# Patient Record
Sex: MALE | Race: WHITE | NOT HISPANIC OR LATINO | Employment: OTHER | URBAN - METROPOLITAN AREA
[De-identification: names, ages, dates, MRNs, and addresses within clinical notes are randomized per-mention and may not be internally consistent; named-entity substitution may affect disease eponyms.]

---

## 2018-03-07 ENCOUNTER — OFFICE VISIT (OUTPATIENT)
Dept: SURGERY | Facility: CLINIC | Age: 83
End: 2018-03-07
Payer: MEDICARE

## 2018-03-07 VITALS
HEIGHT: 69 IN | SYSTOLIC BLOOD PRESSURE: 126 MMHG | HEART RATE: 90 BPM | BODY MASS INDEX: 30.53 KG/M2 | DIASTOLIC BLOOD PRESSURE: 66 MMHG | WEIGHT: 206.13 LBS

## 2018-03-07 DIAGNOSIS — R22.2 MASS ON BACK: Primary | ICD-10-CM

## 2018-03-07 PROCEDURE — 99999 PR PBB SHADOW E&M-NEW PATIENT-LVL III: CPT | Mod: PBBFAC,,, | Performed by: SURGERY

## 2018-03-07 PROCEDURE — 99203 OFFICE O/P NEW LOW 30 MIN: CPT | Mod: S$GLB,,, | Performed by: SURGERY

## 2018-03-07 RX ORDER — CHOLECALCIFEROL (VITAMIN D3) 50 MCG
2000 TABLET ORAL DAILY
COMMUNITY

## 2018-03-07 RX ORDER — TAMSULOSIN HYDROCHLORIDE 0.4 MG/1
CAPSULE ORAL
COMMUNITY
Start: 2018-02-14

## 2018-03-07 RX ORDER — ALLOPURINOL 100 MG/1
100 TABLET ORAL 2 TIMES DAILY
COMMUNITY
Start: 2017-09-06

## 2018-03-07 RX ORDER — DONEPEZIL HYDROCHLORIDE 10 MG/1
10 TABLET, FILM COATED ORAL NIGHTLY
COMMUNITY
Start: 2018-02-22 | End: 2019-02-22

## 2018-03-07 RX ORDER — LOSARTAN POTASSIUM AND HYDROCHLOROTHIAZIDE 12.5; 5 MG/1; MG/1
TABLET ORAL
Status: ON HOLD | COMMUNITY
Start: 2018-02-14 | End: 2018-04-12 | Stop reason: HOSPADM

## 2018-03-07 RX ORDER — SIMVASTATIN 10 MG/1
10 TABLET, FILM COATED ORAL NIGHTLY
COMMUNITY
Start: 2018-02-15 | End: 2019-02-15

## 2018-03-07 RX ORDER — GEMFIBROZIL 600 MG/1
600 TABLET, FILM COATED ORAL NIGHTLY
COMMUNITY

## 2018-03-07 RX ORDER — ESCITALOPRAM OXALATE 10 MG/1
TABLET ORAL
COMMUNITY
Start: 2018-02-14

## 2018-03-07 RX ORDER — SODIUM CHLORIDE 9 MG/ML
INJECTION, SOLUTION INTRAVENOUS CONTINUOUS
Status: CANCELLED | OUTPATIENT
Start: 2018-03-07

## 2018-03-07 NOTE — LETTER
March 17, 2018      Justino Sy MD  [address]  [place] [address]           [place] [facility] - General Surgery  [address]  [place] [address]  Phone: [phone]          Patient: Blake Bradley   MR Number: [id number]   YOB: 1928   Date of Visit: 3/7/2018       Dear Dr. Justino Sy:    Thank you for referring Blake Bradley to me for evaluation. Attached you will find relevant portions of my assessment and plan of care.    If you have questions, please do not hesitate to call me. I look forward to following Blake Bradley along with you.    Sincerely,    Roberto Matthews MD    Enclosure  CC:  No Recipients    If you would like to receive this communication electronically, please contact [email or web address] or [phone] to request more information on [employer] access.    For providers and/or their staff who would like to refer a patient to [facility], please contact us through our one-stop-shop provider referral line, [facility], at [phone].    If you feel you have received this communication in error or would no longer like to receive these types of communications, please e-mail [email or web address]

## 2018-03-13 ENCOUNTER — HOSPITAL ENCOUNTER (OUTPATIENT)
Dept: PREADMISSION TESTING | Facility: HOSPITAL | Age: 83
Discharge: HOME | End: 2018-03-13
Attending: SURGERY
Payer: MEDICARE

## 2018-03-13 VITALS — WEIGHT: 200 LBS | HEIGHT: 70 IN | BODY MASS INDEX: 28.63 KG/M2

## 2018-03-13 DIAGNOSIS — R22.2 MASS ON BACK: Primary | ICD-10-CM

## 2018-03-13 DIAGNOSIS — Z01.818 PRE-OP EXAM: ICD-10-CM

## 2018-03-13 LAB
ALBUMIN SERPL BCP-MCNC: 3.5 G/DL
ALP SERPL-CCNC: 71 U/L
ALT SERPL W/O P-5'-P-CCNC: 12 U/L
ANION GAP SERPL CALC-SCNC: 10 MMOL/L
AST SERPL-CCNC: 15 U/L
BASOPHILS # BLD AUTO: 0 K/UL
BASOPHILS NFR BLD: 0.6 %
BILIRUB SERPL-MCNC: 0.6 MG/DL
BUN SERPL-MCNC: 20 MG/DL
CALCIUM SERPL-MCNC: 9.1 MG/DL
CHLORIDE SERPL-SCNC: 106 MMOL/L
CO2 SERPL-SCNC: 28 MMOL/L
CREAT SERPL-MCNC: 1.2 MG/DL
DIFFERENTIAL METHOD: ABNORMAL
EOSINOPHIL # BLD AUTO: 0.3 K/UL
EOSINOPHIL NFR BLD: 4.3 %
ERYTHROCYTE [DISTWIDTH] IN BLOOD BY AUTOMATED COUNT: 15.1 %
EST. GFR  (AFRICAN AMERICAN): >60 ML/MIN/1.73 M^2
EST. GFR  (NON AFRICAN AMERICAN): 53 ML/MIN/1.73 M^2
GLUCOSE SERPL-MCNC: 95 MG/DL
HCT VFR BLD AUTO: 40.9 %
HGB BLD-MCNC: 13.5 G/DL
LYMPHOCYTES # BLD AUTO: 0.7 K/UL
LYMPHOCYTES NFR BLD: 11.9 %
MCH RBC QN AUTO: 32.1 PG
MCHC RBC AUTO-ENTMCNC: 33 G/DL
MCV RBC AUTO: 97 FL
MONOCYTES # BLD AUTO: 0.4 K/UL
MONOCYTES NFR BLD: 7.1 %
NEUTROPHILS # BLD AUTO: 4.4 K/UL
NEUTROPHILS NFR BLD: 76.1 %
PLATELET # BLD AUTO: 132 K/UL
PMV BLD AUTO: 9.1 FL
POTASSIUM SERPL-SCNC: 4.3 MMOL/L
PROT SERPL-MCNC: 6.5 G/DL
RBC # BLD AUTO: 4.21 M/UL
SODIUM SERPL-SCNC: 144 MMOL/L
WBC # BLD AUTO: 5.8 K/UL

## 2018-03-13 PROCEDURE — 85025 COMPLETE CBC W/AUTO DIFF WBC: CPT

## 2018-03-13 PROCEDURE — 93010 ELECTROCARDIOGRAM REPORT: CPT | Mod: ,,, | Performed by: INTERNAL MEDICINE

## 2018-03-13 PROCEDURE — 36415 COLL VENOUS BLD VENIPUNCTURE: CPT

## 2018-03-13 PROCEDURE — 80053 COMPREHEN METABOLIC PANEL: CPT

## 2018-03-13 PROCEDURE — 93005 ELECTROCARDIOGRAM TRACING: CPT

## 2018-03-13 PROCEDURE — 99900103 DSU ONLY-NO CHARGE-INITIAL HR (STAT)

## 2018-03-13 PROCEDURE — 99900104 DSU ONLY-NO CHARGE-EA ADD'L HR (STAT)

## 2018-03-17 NOTE — PROGRESS NOTES
Subjective:       Patient ID: Blake Bradley is a 89 y.o. male.    Chief Complaint: Consult (growth on back, needs biopsy)      HPI 89-year-old male referred for evaluation of a right lower back mass. CT showed suspicious features and excision was recommended.    Past Medical History:   Diagnosis Date    Depression     Disorder of kidney and ureter     renal stones and cysts    Gout     Hyperlipidemia     Hypertension     Kidney stones     Macular degeneration     Psoriasis     PVD (peripheral vascular disease)      Past Surgical History:   Procedure Laterality Date    Laft knee      50 years ago; repair         Current Outpatient Prescriptions:     allopurinol (ZYLOPRIM) 100 MG tablet, , Disp: , Rfl:     donepezil (ARICEPT) 10 MG tablet, Take 10 mg by mouth., Disp: , Rfl:     escitalopram oxalate (LEXAPRO) 10 MG tablet, TAKE ONE TABLET BY MOUTH ONCE DAILY, Disp: , Rfl:     gemfibrozil (LOPID) 600 MG tablet, Take 600 mg by mouth., Disp: , Rfl:     losartan-hydrochlorothiazide 50-12.5 mg (HYZAAR) 50-12.5 mg per tablet, TAKE ONE TABLET BY MOUTH ONCE DAILY, Disp: , Rfl:     simvastatin (ZOCOR) 10 MG tablet, Take 10 mg by mouth., Disp: , Rfl:     tamsulosin (FLOMAX) 0.4 mg Cp24, TAKE ONE CAPSULE BY MOUTH ONCE DAILY, Disp: , Rfl:     vitamin D 1000 units Tab, Take by mouth., Disp: , Rfl:     Review of patient's allergies indicates:   Allergen Reactions    Augmentin [amoxicillin-pot clavulanate]     Clindamycin     Iodine and iodide containing products     Pcn [penicillins]        History reviewed. No pertinent family history.  Social History     Social History    Marital status:      Spouse name: N/A    Number of children: N/A    Years of education: N/A     Occupational History    Not on file.     Social History Main Topics    Smoking status: Never Smoker    Smokeless tobacco: Never Used    Alcohol use No    Drug use: No    Sexual activity: Not on file     Other Topics Concern     Not on file     Social History Narrative    No narrative on file       Review of Systems   Constitutional: Negative for chills, fatigue, fever and unexpected weight change.   HENT: Negative for congestion, sore throat, trouble swallowing and voice change.    Eyes: Negative for redness and visual disturbance.   Respiratory: Negative for cough, shortness of breath and wheezing.    Cardiovascular: Negative for chest pain and palpitations.   Gastrointestinal: Negative for abdominal pain, blood in stool, nausea and vomiting.   Genitourinary: Negative for dysuria, frequency, hematuria and urgency.   Musculoskeletal: Negative for arthralgias, myalgias and neck pain.   Skin: Negative for rash and wound.   Allergic/Immunologic: Negative.    Neurological: Negative for tremors, seizures, weakness and headaches.   Hematological: Does not bruise/bleed easily.   Psychiatric/Behavioral: Negative for confusion and dysphoric mood. The patient is not nervous/anxious.      Objective:     Physical Exam   Constitutional: He is oriented to person, place, and time. He appears well-developed and well-nourished. No distress.   HENT:   Head: Normocephalic and atraumatic.   Eyes: Conjunctivae and EOM are normal. Pupils are equal, round, and reactive to light. No scleral icterus.   Neck: Normal range of motion. Neck supple. No thyromegaly present.   Cardiovascular: Normal rate, regular rhythm, normal heart sounds and intact distal pulses.    No murmur heard.  Pulmonary/Chest: Effort normal and breath sounds normal. No respiratory distress. He has no wheezes. He has no rales.   Abdominal: Soft. Bowel sounds are normal. He exhibits no distension. There is no tenderness. No hernia.   Musculoskeletal: Normal range of motion. He exhibits no edema or tenderness.   Lymphadenopathy:     He has no cervical adenopathy.   Neurological: He is alert and oriented to person, place, and time. No cranial nerve deficit.   Skin: Skin is warm and dry. No rash  noted. No erythema.   10 CM right lower back mass. Non tender. Has the consistency of a lipoma.   Psychiatric: He has a normal mood and affect. His behavior is normal. Judgment normal.     Assessment:     Encounter Diagnosis   Name Primary?    Mass on back Yes       Plan:      1. Plan excision of back mass.  2. Risks and benefits of the planned procedure were discussed at length with the patient.  Risks and benefits of not proceeding with the procedure were discussed as well. All questions were answered. The patient expressed clear understanding and would like to proceed with the procedure as discussed.

## 2018-03-21 ENCOUNTER — ANESTHESIA EVENT (OUTPATIENT)
Dept: SURGERY | Facility: HOSPITAL | Age: 83
End: 2018-03-21
Payer: MEDICARE

## 2018-03-22 ENCOUNTER — HOSPITAL ENCOUNTER (OUTPATIENT)
Facility: HOSPITAL | Age: 83
Discharge: HOME | End: 2018-03-22
Attending: SURGERY | Admitting: SURGERY
Payer: MEDICARE

## 2018-03-22 ENCOUNTER — TELEPHONE (OUTPATIENT)
Dept: SURGERY | Facility: CLINIC | Age: 83
End: 2018-03-22

## 2018-03-22 ENCOUNTER — ANESTHESIA (OUTPATIENT)
Dept: SURGERY | Facility: HOSPITAL | Age: 83
End: 2018-03-22
Payer: MEDICARE

## 2018-03-22 DIAGNOSIS — R22.2 MASS ON BACK: Primary | ICD-10-CM

## 2018-03-22 PROCEDURE — 71000033 HC RECOVERY, INTIAL HOUR: Performed by: SURGERY

## 2018-03-22 PROCEDURE — 63600175 PHARM REV CODE 636 W HCPCS: Performed by: SURGERY

## 2018-03-22 PROCEDURE — 36000706: Performed by: SURGERY

## 2018-03-22 PROCEDURE — 25000003 PHARM REV CODE 250: Performed by: SURGERY

## 2018-03-22 PROCEDURE — 21933 EXC BACK TUM DEEP 5 CM/>: CPT | Mod: ,,, | Performed by: SURGERY

## 2018-03-22 PROCEDURE — C1729 CATH, DRAINAGE: HCPCS | Performed by: SURGERY

## 2018-03-22 PROCEDURE — 36000707: Performed by: SURGERY

## 2018-03-22 PROCEDURE — 99900103 DSU ONLY-NO CHARGE-INITIAL HR (STAT): Performed by: SURGERY

## 2018-03-22 PROCEDURE — 37000009 HC ANESTHESIA EA ADD 15 MINS: Performed by: SURGERY

## 2018-03-22 PROCEDURE — 27201423 OPTIME MED/SURG SUP & DEVICES STERILE SUPPLY: Performed by: SURGERY

## 2018-03-22 PROCEDURE — 37000008 HC ANESTHESIA 1ST 15 MINUTES: Performed by: SURGERY

## 2018-03-22 PROCEDURE — 27200651 HC AIRWAY, LMA: Performed by: NURSE ANESTHETIST, CERTIFIED REGISTERED

## 2018-03-22 PROCEDURE — 71000016 HC POSTOP RECOV ADDL HR: Performed by: SURGERY

## 2018-03-22 PROCEDURE — 88307 TISSUE EXAM BY PATHOLOGIST: CPT | Mod: 26,,, | Performed by: PATHOLOGY

## 2018-03-22 PROCEDURE — 25000003 PHARM REV CODE 250

## 2018-03-22 PROCEDURE — 88307 TISSUE EXAM BY PATHOLOGIST: CPT | Performed by: PATHOLOGY

## 2018-03-22 PROCEDURE — D9220A PRA ANESTHESIA: Mod: ANES,,, | Performed by: ANESTHESIOLOGY

## 2018-03-22 PROCEDURE — 71000015 HC POSTOP RECOV 1ST HR: Performed by: SURGERY

## 2018-03-22 PROCEDURE — 99900104 DSU ONLY-NO CHARGE-EA ADD'L HR (STAT): Performed by: SURGERY

## 2018-03-22 PROCEDURE — 63600175 PHARM REV CODE 636 W HCPCS: Performed by: NURSE ANESTHETIST, CERTIFIED REGISTERED

## 2018-03-22 PROCEDURE — D9220A PRA ANESTHESIA: Mod: CRNA,,, | Performed by: NURSE ANESTHETIST, CERTIFIED REGISTERED

## 2018-03-22 PROCEDURE — 25000003 PHARM REV CODE 250: Performed by: ANESTHESIOLOGY

## 2018-03-22 RX ORDER — FENTANYL CITRATE 50 UG/ML
INJECTION, SOLUTION INTRAMUSCULAR; INTRAVENOUS
Status: DISCONTINUED | OUTPATIENT
Start: 2018-03-22 | End: 2018-03-22

## 2018-03-22 RX ORDER — HYDROCODONE BITARTRATE AND ACETAMINOPHEN 7.5; 325 MG/1; MG/1
1 TABLET ORAL EVERY 4 HOURS PRN
Qty: 30 TABLET | Refills: 0 | Status: SHIPPED | OUTPATIENT
Start: 2018-03-22 | End: 2018-04-01

## 2018-03-22 RX ORDER — PROPOFOL 10 MG/ML
VIAL (ML) INTRAVENOUS
Status: DISCONTINUED | OUTPATIENT
Start: 2018-03-22 | End: 2018-03-22

## 2018-03-22 RX ORDER — HYDROMORPHONE HYDROCHLORIDE 2 MG/ML
0.2 INJECTION, SOLUTION INTRAMUSCULAR; INTRAVENOUS; SUBCUTANEOUS EVERY 5 MIN PRN
Status: DISCONTINUED | OUTPATIENT
Start: 2018-03-22 | End: 2018-03-22 | Stop reason: HOSPADM

## 2018-03-22 RX ORDER — OXYCODONE HYDROCHLORIDE 5 MG/1
5 TABLET ORAL ONCE AS NEEDED
Status: DISCONTINUED | OUTPATIENT
Start: 2018-03-23 | End: 2018-03-22 | Stop reason: HOSPADM

## 2018-03-22 RX ORDER — LIDOCAINE HYDROCHLORIDE 10 MG/ML
INJECTION, SOLUTION EPIDURAL; INFILTRATION; INTRACAUDAL; PERINEURAL
Status: COMPLETED
Start: 2018-03-22 | End: 2018-03-22

## 2018-03-22 RX ORDER — PHENYLEPHRINE HYDROCHLORIDE 10 MG/ML
INJECTION INTRAVENOUS
Status: DISCONTINUED | OUTPATIENT
Start: 2018-03-22 | End: 2018-03-22

## 2018-03-22 RX ORDER — BUPIVACAINE HYDROCHLORIDE AND EPINEPHRINE 2.5; 5 MG/ML; UG/ML
INJECTION, SOLUTION EPIDURAL; INFILTRATION; INTRACAUDAL; PERINEURAL
Status: DISCONTINUED | OUTPATIENT
Start: 2018-03-22 | End: 2018-03-22 | Stop reason: HOSPADM

## 2018-03-22 RX ORDER — ONDANSETRON 2 MG/ML
4 INJECTION INTRAMUSCULAR; INTRAVENOUS ONCE
Status: DISCONTINUED | OUTPATIENT
Start: 2018-03-22 | End: 2018-03-22 | Stop reason: HOSPADM

## 2018-03-22 RX ORDER — DIPHENHYDRAMINE HYDROCHLORIDE 50 MG/ML
25 INJECTION INTRAMUSCULAR; INTRAVENOUS EVERY 6 HOURS PRN
Status: DISCONTINUED | OUTPATIENT
Start: 2018-03-22 | End: 2018-03-22 | Stop reason: HOSPADM

## 2018-03-22 RX ORDER — HYDROCODONE BITARTRATE AND ACETAMINOPHEN 5; 325 MG/1; MG/1
1 TABLET ORAL EVERY 4 HOURS PRN
Status: DISCONTINUED | OUTPATIENT
Start: 2018-03-22 | End: 2018-03-22 | Stop reason: HOSPADM

## 2018-03-22 RX ORDER — MEPERIDINE HYDROCHLORIDE 25 MG/ML
12.5 INJECTION INTRAMUSCULAR; INTRAVENOUS; SUBCUTANEOUS ONCE AS NEEDED
Status: DISCONTINUED | OUTPATIENT
Start: 2018-03-22 | End: 2018-03-22 | Stop reason: HOSPADM

## 2018-03-22 RX ORDER — SODIUM CHLORIDE 9 MG/ML
INJECTION, SOLUTION INTRAVENOUS CONTINUOUS
Status: DISCONTINUED | OUTPATIENT
Start: 2018-03-22 | End: 2018-03-22 | Stop reason: HOSPADM

## 2018-03-22 RX ORDER — LIDOCAINE HYDROCHLORIDE 10 MG/ML
5 INJECTION, SOLUTION EPIDURAL; INFILTRATION; INTRACAUDAL; PERINEURAL ONCE
Status: COMPLETED | OUTPATIENT
Start: 2018-03-22 | End: 2018-03-22

## 2018-03-22 RX ORDER — SODIUM CHLORIDE 0.9 % (FLUSH) 0.9 %
3 SYRINGE (ML) INJECTION
Status: DISCONTINUED | OUTPATIENT
Start: 2018-03-22 | End: 2018-03-22 | Stop reason: HOSPADM

## 2018-03-22 RX ORDER — FENTANYL CITRATE 50 UG/ML
25 INJECTION, SOLUTION INTRAMUSCULAR; INTRAVENOUS EVERY 5 MIN PRN
Status: DISCONTINUED | OUTPATIENT
Start: 2018-03-22 | End: 2018-03-22 | Stop reason: HOSPADM

## 2018-03-22 RX ORDER — ONDANSETRON 2 MG/ML
INJECTION INTRAMUSCULAR; INTRAVENOUS
Status: DISCONTINUED | OUTPATIENT
Start: 2018-03-22 | End: 2018-03-22

## 2018-03-22 RX ORDER — MIDAZOLAM HYDROCHLORIDE 1 MG/ML
INJECTION, SOLUTION INTRAMUSCULAR; INTRAVENOUS
Status: DISCONTINUED | OUTPATIENT
Start: 2018-03-22 | End: 2018-03-22

## 2018-03-22 RX ADMIN — MIDAZOLAM 1 MG: 1 INJECTION INTRAMUSCULAR; INTRAVENOUS at 09:03

## 2018-03-22 RX ADMIN — FENTANYL CITRATE 100 MCG: 50 INJECTION, SOLUTION INTRAMUSCULAR; INTRAVENOUS at 10:03

## 2018-03-22 RX ADMIN — LIDOCAINE HYDROCHLORIDE: 10 INJECTION, SOLUTION EPIDURAL; INFILTRATION; INTRACAUDAL; PERINEURAL at 08:03

## 2018-03-22 RX ADMIN — PROPOFOL 100 MG: 10 INJECTION, EMULSION INTRAVENOUS at 10:03

## 2018-03-22 RX ADMIN — PHENYLEPHRINE HYDROCHLORIDE 100 MCG: 10 INJECTION INTRAVENOUS at 11:03

## 2018-03-22 RX ADMIN — SODIUM CHLORIDE, SODIUM GLUCONATE, SODIUM ACETATE, POTASSIUM CHLORIDE, MAGNESIUM CHLORIDE, SODIUM PHOSPHATE, DIBASIC, AND POTASSIUM PHOSPHATE: .53; .5; .37; .037; .03; .012; .00082 INJECTION, SOLUTION INTRAVENOUS at 08:03

## 2018-03-22 RX ADMIN — MIDAZOLAM 1 MG: 1 INJECTION INTRAMUSCULAR; INTRAVENOUS at 10:03

## 2018-03-22 RX ADMIN — VANCOMYCIN HYDROCHLORIDE 1000 MG: 1 INJECTION, POWDER, LYOPHILIZED, FOR SOLUTION INTRAVENOUS at 09:03

## 2018-03-22 RX ADMIN — VANCOMYCIN HYDROCHLORIDE 1 G: 1 INJECTION, POWDER, LYOPHILIZED, FOR SOLUTION INTRAVENOUS at 09:03

## 2018-03-22 RX ADMIN — ONDANSETRON 4 MG: 2 INJECTION, SOLUTION INTRAMUSCULAR; INTRAVENOUS at 10:03

## 2018-03-22 RX ADMIN — PHENYLEPHRINE HYDROCHLORIDE 100 MCG: 10 INJECTION INTRAVENOUS at 10:03

## 2018-03-22 NOTE — TELEPHONE ENCOUNTER
----- Message from Neelam Swanson sent at 3/22/2018 12:09 PM CDT -----  Contact: [facility] needs to make a 1 week post op appt to see Dr  No appt available in 1 week     Patient wants afternoon in [place]       Please call back [phone] (home)

## 2018-03-22 NOTE — BRIEF OP NOTE
Patient: Blake Bradley     Date of Procedure: 3/22/2018    Procedure: Excision of right lower subcutaneous back mass    Surgeon: Roberto Muro MD    Assistant: Ivy Hills    Pre-op Diagnosis: Mass on back [R22.2]     Post-op Diagnosis: Mass on back [R22.2]    Findings: large adherent back mass    Specimen: back mass    EBL: 50 cc    Complications: None

## 2018-03-22 NOTE — ANESTHESIA PREPROCEDURE EVALUATION
03/22/2018  Blake Bradley is a 89 y.o., male.    Anesthesia Evaluation    I have reviewed the Patient Summary Reports.    I have reviewed the Nursing Notes.   I have reviewed the Medications.     Review of Systems  Social:  Non-Smoker    Cardiovascular:   Hypertension hyperlipidemia    Renal/:   Chronic Renal Disease    Psych:   Psychiatric History          Physical Exam  General:  Obesity    Airway/Jaw/Neck:  Airway Findings: Mouth Opening: Normal Tongue: Normal  General Airway Assessment: Adult, Good  Mallampati: II  Improves to II with phonation.  TM Distance: 4-6 cm      Dental:  Dental Findings: In tact   Chest/Lungs:  Chest/Lungs Findings: Clear to auscultation, Normal Respiratory Rate     Heart/Vascular:  Heart Findings: Rate: Normal  Rhythm: Regular Rhythm  Sounds: Normal  Heart murmur: negative       Mental Status:  Mental Status Findings:  Cooperative, Alert and Oriented         Anesthesia Plan  Type of Anesthesia, risks & benefits discussed:  Anesthesia Type:  general  Patient's Preference:   Intra-op Monitoring Plan: standard ASA monitors  Intra-op Monitoring Plan Comments:   Post Op Pain Control Plan:   Post Op Pain Control Plan Comments:   Induction:   IV  Beta Blocker:  Patient is not currently on a Beta-Blocker (No further documentation required).       Informed Consent: Patient understands risks and agrees with Anesthesia plan.  Questions answered. Anesthesia consent signed with patient.  ASA Score: 3     Day of Surgery Review of History & Physical: I have interviewed and examined the patient. I have reviewed the patient's H&P dated:  There are no significant changes.          Ready For Surgery From Anesthesia Perspective.

## 2018-03-22 NOTE — PLAN OF CARE
Pt lying in bed sleeping. No signs/symptoms of pain or distress. Family updated upon pt arrival to recovery room per MD.

## 2018-03-22 NOTE — ANESTHESIA POSTPROCEDURE EVALUATION
"Anesthesia Post Evaluation    Patient: Blake Bradley    Procedure(s) Performed: Procedure(s) (LRB):  REMOVAL-MASS (Right)    Final Anesthesia Type: general  Patient location during evaluation: PACU  Patient participation: Yes- Able to Participate  Level of consciousness: awake and alert and oriented  Post-procedure vital signs: reviewed and stable  Pain management: adequate  Airway patency: patent  PONV status at discharge: No PONV  Anesthetic complications: no      Cardiovascular status: blood pressure returned to baseline  Respiratory status: unassisted, spontaneous ventilation and room air  Hydration status: euvolemic  Follow-up not needed.        Visit Vitals  BP (!) 127/55   Pulse 67   Temp 37 °C (98.6 °F) (Temporal)   Resp 16   Ht 5' 10" (1.778 m)   Wt 90.7 kg (200 lb)   SpO2 (!) 93%   BMI 28.70 kg/m²       Pain/Ethan Score: Pain Assessment Performed: Yes (3/22/2018 11:56 AM)  Presence of Pain: denies (3/22/2018 11:56 AM)  Ethan Score: 10 (3/22/2018 11:45 AM)      "

## 2018-03-22 NOTE — TRANSFER OF CARE
"Anesthesia Transfer of Care Note    Patient: Blake Bradley    Procedure(s) Performed: Procedure(s) (LRB):  REMOVAL-MASS (Right)    Patient location: PACU    Anesthesia Type: general    Transport from OR: Transported from OR on 2-3 L/min O2 by NC with adequate spontaneous ventilation    Post pain: adequate analgesia    Post assessment: no apparent anesthetic complications    Post vital signs: stable    Level of consciousness: awake    Nausea/Vomiting: no nausea/vomiting    Complications: none    Transfer of care protocol was followed      Last vitals:   Visit Vitals  /69 (BP Location: Left arm, Patient Position: Lying)   Pulse 77   Temp 37.5 °C (99.5 °F) (Other (see comments))   Resp 20   Ht 5' 10" (1.778 m)   Wt 90.7 kg (200 lb)   SpO2 96%   BMI 28.70 kg/m²     "

## 2018-03-22 NOTE — OR NURSING
Ambulated to bathroom to void using wife's rollator.  Tolerated well. Patient dressing in restroom assisting per spouse.

## 2018-03-22 NOTE — DISCHARGE SUMMARY
Discharge Summary  General Surgery      Admit Date: 3/22/2018    Discharge Date :3/22/2018    Attending Physician: Roberto Matthews     Discharge Physician: Roberto Matthews    Discharged Condition: good    Discharge Diagnosis: Mass on back [R22.2]    Treatments/Procedures: Procedure(s) (LRB):  REMOVAL-MASS (Right)    Hospital Course: Uneventful; Discharged home from Recovery    Significant Diagnostic Studies: none    Disposition: Home or Self Care    Diet: Regular    Follow up: Office 10-14 days    Activity: No heavy lifting till seen in office.    Patient Instructions:   Current Discharge Medication List      START taking these medications    Details   hydrocodone-acetaminophen 7.5-325mg (NORCO) 7.5-325 mg per tablet Take 1 tablet by mouth every 4 (four) hours as needed for Pain.  Qty: 30 tablet, Refills: 0         CONTINUE these medications which have NOT CHANGED    Details   allopurinol (ZYLOPRIM) 100 MG tablet       donepezil (ARICEPT) 10 MG tablet Take 10 mg by mouth.      escitalopram oxalate (LEXAPRO) 10 MG tablet TAKE ONE TABLET BY MOUTH ONCE DAILY      gemfibrozil (LOPID) 600 MG tablet Take 600 mg by mouth.      losartan-hydrochlorothiazide 50-12.5 mg (HYZAAR) 50-12.5 mg per tablet TAKE ONE TABLET BY MOUTH ONCE DAILY      simvastatin (ZOCOR) 10 MG tablet Take 10 mg by mouth.      tamsulosin (FLOMAX) 0.4 mg Cp24 TAKE ONE CAPSULE BY MOUTH ONCE DAILY      vitamin D 1000 units Tab Take by mouth.               Discharge Procedure Orders  Diet general     Activity as tolerated     Call MD for:  temperature >100.4     Call MD for:  persistent nausea and vomiting     Call MD for:  severe uncontrolled pain     Remove dressing in 48 hours

## 2018-03-22 NOTE — PLAN OF CARE
Vs stable.  Denies pain.  Tolerated po fluids.  Discharge teaching completed.  Discharging shortly.

## 2018-03-22 NOTE — DISCHARGE INSTRUCTIONS
Discharge Instructions: Caring for Your Ronny-Lai Drainage Tube  Your doctor discharges you with a Ronny-Lai drainage tube. Doctors commonly leave this drain within the abdominal cavity after surgery. It helps drain and collect blood and body fluid after surgery. This can prevent swelling and reduces the risk for infection. The tube is held in place by a few stitches. It is covered with a bandage. Your doctor will remove the drain when he or she determines you no longer need it.  Home care  · Dont sleep on the same side as the tube.  · Secure the tube and bag inside your clothing with a safety pin. This helps keep the tube from being pulled out.  · Empty your drain at least twice a day. Empty it more often if the drain is full. Wash  and dry your hands before emptying the drain.  ¨ Lift the opening on the drain.  ¨ Drain the fluid into a measuring cup.  ¨ Record the amount of fluid each time you empty the drain. Include the date and time it was emptied. Share this information with your doctor on your next visit.  ¨ Squeeze the bulb with your hands until you hear air coming out of the bulb if your doctor has instructed you to do so (sometimes the bulb is used as a reservoir without suction). Check with your doctor about specific drain instructions.  ¨ Close the opening.  · Change the dressing around the tube every day.  ¨ Wash your hands.  ¨ Remove the old bandage.  ¨ Wash your hands again.  ¨ Wet a cotton swab and clean the skin around the incision and tube site. Use normal saline solution (salt and water). Or, you can use warm, soapy water.  ¨ Put a new bandage on the incision and tube site. Make the bandage large enough to cover the whole incision area.  ¨ Tape the bandage in place.  · Keep the bandage and tube site dry when you shower. Ask your healthcare provider about the best way to do this.  · Stripping the tube helps keep blood clots from blocking the tube. Ask your nurse how often you should  strip the tube. Stripping may not be needed, depending on where and why your doctor placed the tube. It may even be dangerous in some cases.   ¨ Hold the tubing where it leaves the skin, with one hand. This keeps it from pulling on the skin.  ¨ Pinch the tubing with the thumb and first finger of your other hand.  ¨ Slowly and firmly pull your thumb and first finger down the tubing. You may find it helpful to hold an alcohol swab between your fingers and the tube to lubricate the tubing.  ¨ If the pulling hurts or feels like its coming out of the skin, stop. Begin again more gently.  Follow-up care  Make a follow-up appointment as directed by our staff.     When to seek medical care  Call your healthcare provider right away if you have any of the following:  · New or increased pain around the tube  · Redness, swelling, or warmth around the incision or tube  · Drainage that is foul-smelling  · Vomiting  · Fever of 100.4°F (38°C)  · Fluid leaking around the tube  · Incision seems not to be healing  · Stitches become loose  · Tube falls out or breaks  · Drainage that changes from light pink to dark red  · Blood clots in the drainage bulb  · A sudden increase or decrease in the amount of drainage (over 30 mL)   Date Last Reviewed: 2/1/2017 © 2000-2017 [employer] [address] [place] [address], [place] [address]. All rights reserved. This information is not intended as a substitute for professional medical care. Always follow your healthcare professional's instructions.        General Information:    1.  Do not drink alcoholic beverages including beer for 24 hours or as long as you are on pain medication..  2.  Do not drive a motor vehicle, operate machinery or power tools, or signs legal papers for 24 hours or as long as you are on pain medication.   3.  You may experience light-headedness, dizziness, and sleepiness following surgery. Please do not stay alone. A responsible adult should be with you for this 24  hour period.  4.  Go home and rest.    5. Progress slowly to a normal diet unless instructed.  Otherwise, begin with liquids such as soft drinks, then soup and crackers working up to solid foods. Drink plenty of nonalcoholic fluids.  6.  Certain anesthetics and pain medications produce nausea and vomiting in certain       individuals. If nausea becomes a problem at home, call you doctor.    7. A nurse will be calling you sometime after surgery. Do not be alarmed. This is our way of finding out how you are doing.    8. Several times every hour while you are awake, take 2-3 deep breaths and cough. If you had stomach surgery hold a pillow or rolled towel firmly against your stomach before you cough. This will help with any pain the cough might cause.  9. Several times every hour while you are awake, pump and flex your feet 5-6 times and do foot circles. This will help prevent blood clots.    10.Call your doctor for severe pain, bleeding, fever, or signs or symptoms of infection (pain, swelling, redness, foul odor, drainage).    Discharge Instructions: After Your Surgery/Procedure  Youve just had surgery. During surgery you were given medicine called anesthesia to keep you relaxed and free of pain. After surgery you may have some pain or nausea. This is common. Here are some tips for feeling better and getting well after surgery.     Stay on schedule with your medication.   Going home  Your doctor or nurse will show you how to take care of yourself when you go home. He or she will also answer your questions. Have an adult family member or friend drive you home.      For your safety we recommend these precaution for the first 24 hours after your procedure:  · Do not drive or use heavy equipment.  · Do not make important decisions or sign legal papers.  · Do not drink alcohol.  · Have someone stay with you, if needed. He or she can watch for problems and help keep you safe.  · Your concentration, balance, coordination,  "and judgement may be impaired for many hours after anesthesia.  Use caution when ambulating or standing up.     · You may feel weak and "washed out" after anesthesia and surgery.      Subtle residual effects of general anesthesia or sedation with regional / local anesthesia can last more than 24 hours.  Rest for the remainder of the day or longer if your Doctor/Surgeon has advised you to do so.  Although you may feel normal within the first 24 hours, your reflexes and mental ability may be impaired without you realizing it.  You may feel dizzy, lightheaded or sleepy for 24 hours or longer.      Be sure to go to all follow-up visits with your doctor. And rest after your surgery for as long as your doctor tells you to.  Coping with pain  If you have pain after surgery, pain medicine will help you feel better. Take it as told, before pain becomes severe. Also, ask your doctor or pharmacist about other ways to control pain. This might be with heat, ice, or relaxation. And follow any other instructions your surgeon or nurse gives you.  Tips for taking pain medicine  To get the best relief possible, remember these points:  · Pain medicines can upset your stomach. Taking them with a little food may help.  · Most pain relievers taken by mouth need at least 20 to 30 minutes to start to work.  · Taking medicine on a schedule can help you remember to take it. Try to time your medicine so that you can take it before starting an activity. This might be before you get dressed, go for a walk, or sit down for dinner.  · Constipation is a common side effect of pain medicines. Call your doctor before taking any medicines such as laxatives or stool softeners to help ease constipation. Also ask if you should skip any foods. Drinking lots of fluids and eating foods such as fruits and vegetables that are high in fiber can also help. Remember, do not take laxatives unless your surgeon has prescribed them.  · Drinking alcohol and taking " pain medicine can cause dizziness and slow your breathing. It can even be deadly. Do not drink alcohol while taking pain medicine.  · Pain medicine can make you react more slowly to things. Do not drive or run machinery while taking pain medicine.  Your health care provider may tell you to take acetaminophen to help ease your pain. Ask him or her how much you are supposed to take each day. Acetaminophen or other pain relievers may interact with your prescription medicines or other over-the-counter (OTC) drugs. Some prescription medicines have acetaminophen and other ingredients. Using both prescription and OTC acetaminophen for pain can cause you to overdose. Read the labels on your OTC medicines with care. This will help you to clearly know the list of ingredients, how much to take, and any warnings. It may also help you not take too much acetaminophen. If you have questions or do not understand the information, ask your pharmacist or health care provider to explain it to you before you take the OTC medicine.  Managing nausea  Some people have an upset stomach after surgery. This is often because of anesthesia, pain, or pain medicine, or the stress of surgery. These tips will help you handle nausea and eat healthy foods as you get better. If you were on a special food plan before surgery, ask your doctor if you should follow it while you get better. These tips may help:  · Do not push yourself to eat. Your body will tell you when to eat and how much.  · Start off with clear liquids and soup. They are easier to digest.  · Next try semi-solid foods, such as mashed potatoes, applesauce, and gelatin, as you feel ready.  · Slowly move to solid foods. Dont eat fatty, rich, or spicy foods at first.  · Do not force yourself to have 3 large meals a day. Instead eat smaller amounts more often.  · Take pain medicines with a small amount of solid food, such as crackers or toast, to avoid nausea.     Call your surgeon  if  · You still have pain an hour after taking medicine. The medicine may not be strong enough.  · You feel too sleepy, dizzy, or groggy. The medicine may be too strong.  · You have side effects like nausea, vomiting, or skin changes, such as rash, itching, or hives.       If you have obstructive sleep apnea  You were given anesthesia medicine during surgery to keep you comfortable and free of pain. After surgery, you may have more apnea spells because of this medicine and other medicines you were given. The spells may last longer than usual.   At home:  · Keep using the continuous positive airway pressure (CPAP) device when you sleep. Unless your health care provider tells you not to, use it when you sleep, day or night. CPAP is a common device used to treat obstructive sleep apnea.  · Talk with your provider before taking any pain medicine, muscle relaxants, or sedatives. Your provider will tell you about the possible dangers of taking these medicines.  © 4886-4081 [employer]. [address] [place] [address]. All rights reserved. This information is not intended as a substitute for professional medical care. Always follow your healthcare professional's instructions.  Post op instructions for prevention of DVT  What is deep vein thrombosis?  Deep vein thrombosis (DVT) is the medical term for blood clots in the deep veins of the leg.  These blood clots can be dangerous.  A DVT can block a blood vessel and keep blood from getting where it needs to go.  Another problem is that the clot can travel to other parts of the body such as the lungs.  A clot that travels to the lungs is called a pulmonary embolus (PE) and can cause serious problems with breathing which can lead to death.  Am I at risk for DVT/PE?  If you are not very active, you are at risk of DVT.  Anyone confined to bed, sitting for long periods of time, recovering from surgery, etc. increases the risk of DVT.  Other risk factors  are cancer diagnosis, certain medications, estrogen replacement in any form,older age, obesity, pregnancy, smoking, history of clotting disorders, and dehydration.  How will I know if I have a DVT?   Swelling in the lower leg   Pain   Warmth, redness, hardness or bulging of the vein  If you have any of these symptoms, call your doctors office right away.  Some people will not have any symptoms until the clot moves to the lungs.  What are the symptoms of a PE?   Panting, shortness of breath, or trouble breathing   Sharp, knife-like chest pain when you breathe   Coughing or coughing up blood   Rapid heartbeat  If you have any of these symptoms or get worse quickly, call 911 for emergency treatment.  How can I prevent a DVT?   Avoid long periods of inactivity and dont cross your legs--get up and walk around every hour or so.   Stay active--walking after surgery is highly encouraged.  This means you should get out of the house and walk in the neighborhood.  Going up and down stairs will not impair healing (unless advised against such activity by your doctor).     Drink plenty of noncaffeinated, nonalcoholic fluids each day to prevent dehydration.   Wear special support stockings, if they have been advised by your doctor.   If you travel, stop at least once an hour and walk around.   Avoid smoking (assistance with stopping is available through your healthcare provider)  Always notify your doctor if you are not able to follow the post operative instructions that are given to you at the time of discharge.  It may be necessary to prescribe one of the medications available to prevent DVT.Using Opioids for Pain Management     Your doctor has given instructions for you to take an opioid.  This is a drug for bad pain.  It helps control pain without causing bleeding and kidney problems.  Common opioid names are morphine, hydromorphone, oxycodone, and methadone. These drugs are called narcotics.    There are  several safety concerns you need to know.     · It is against the law to give or sell this drug to another person.  You must keep this medicine safely locked.    · You may have side effects from taking this medication.  These include nausea, itching, sweating, sleepiness, a change in your ability to breathe, and depression.  · Do not take alcohol or sleeping pills opioids.    · Long-term opoid use may no longer giver you relief from pain.  It can cause you stomach pain, mental anxiety, and headaches.  Long-term opoid use can potentially lead to unlawful street drug abuse and reduce your ability to stay employed.    · Your body may become opioid tolerant if you need to take more to get relief.    · You must stop taking opioids if you begin having more pain as a result of the medicine.    · Opioid withdrawal occurs when you have to stop taking the drug.  It can cause you to have nausea, vomiting, diarrhea, stomach pain, anxiety, and dilated pupils in your eyes. This condition means you are opioid dependent.    · Addiction is a drug induced brain disease. It means there are changes in how your brain is working.  Children, teens, and young adults under 25 years old are more likely to get addicted to opioids.      · Addiction can happen with repeated opioid use.  It does not happen with short-term use of two weeks or less.       For more information, please speak with your doctor or pharmacist.  Follow all instructions in the continuous select a flow nerve block catheter home care instruction pamphlet provided.     Dr. Matthews's nurse will call to schedule your 1 week follow up appointment.  If you don't received this call by lunch time tomorrow, call  to follow up.      We hope your stay was comfortable as you heal now, mend and rest.    For we have enjoyed taking care of you by giving your our best.    And as you get better, by regaining your health and strength;   We count it as a privilege to have served  you and hope your time at [facility] was well spent.      Thank  You!!!

## 2018-03-22 NOTE — OR NURSING
Wife at bedside. Pt denies needs at this time. Text notifications initiated with daughter who is coming back now. Update given.

## 2018-03-23 VITALS
HEIGHT: 70 IN | SYSTOLIC BLOOD PRESSURE: 115 MMHG | OXYGEN SATURATION: 94 % | BODY MASS INDEX: 28.63 KG/M2 | TEMPERATURE: 99 F | HEART RATE: 64 BPM | WEIGHT: 200 LBS | RESPIRATION RATE: 18 BRPM | DIASTOLIC BLOOD PRESSURE: 55 MMHG

## 2018-03-28 ENCOUNTER — TELEPHONE (OUTPATIENT)
Dept: SURGERY | Facility: CLINIC | Age: 83
End: 2018-03-28

## 2018-03-28 ENCOUNTER — OFFICE VISIT (OUTPATIENT)
Dept: SURGERY | Facility: CLINIC | Age: 83
End: 2018-03-28
Payer: MEDICARE

## 2018-03-28 VITALS — BODY MASS INDEX: 29.89 KG/M2 | WEIGHT: 208.31 LBS

## 2018-03-28 DIAGNOSIS — Z98.890 POST-OPERATIVE STATE: Primary | ICD-10-CM

## 2018-03-28 PROCEDURE — 99999 PR PBB SHADOW E&M-EST. PATIENT-LVL II: CPT | Mod: PBBFAC,,, | Performed by: SURGERY

## 2018-03-28 PROCEDURE — 99024 POSTOP FOLLOW-UP VISIT: CPT | Mod: S$GLB,,, | Performed by: SURGERY

## 2018-03-28 NOTE — TELEPHONE ENCOUNTER
----- Message from Louisa Motley sent at 3/28/2018  2:58 PM CDT -----  Contact: Wife  Wife is calling to reschedule the 4/11/18 appt to 11:45 am.  She is requesting Staff return the call.    She can be reached at [phone].    Thank you.

## 2018-04-02 NOTE — PROGRESS NOTES
POST-OP NOTE    PROCEDURE: Excision of back mass    COMPLAINTS: None.    EXAM: Incision well approximated. No drainage. No infection.      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  No evidence of atypia or malignancy.  Correlate clinically    PLAN: FU as needed.

## 2018-04-10 ENCOUNTER — HOSPITAL ENCOUNTER (OUTPATIENT)
Facility: HOSPITAL | Age: 83
LOS: 1 days | Discharge: HOME | End: 2018-04-12
Attending: EMERGENCY MEDICINE | Admitting: INTERNAL MEDICINE
Payer: MEDICARE

## 2018-04-10 DIAGNOSIS — I10 HYPERTENSION, UNSPECIFIED TYPE: ICD-10-CM

## 2018-04-10 DIAGNOSIS — G30.9 ALZHEIMER'S DEMENTIA WITHOUT BEHAVIORAL DISTURBANCE, UNSPECIFIED TIMING OF DEMENTIA ONSET: ICD-10-CM

## 2018-04-10 DIAGNOSIS — I50.9 CONGESTIVE HEART FAILURE, UNSPECIFIED CONGESTIVE HEART FAILURE CHRONICITY, UNSPECIFIED CONGESTIVE HEART FAILURE TYPE: ICD-10-CM

## 2018-04-10 DIAGNOSIS — R06.02 SOB (SHORTNESS OF BREATH): ICD-10-CM

## 2018-04-10 DIAGNOSIS — R06.00 DYSPNEA, UNSPECIFIED TYPE: ICD-10-CM

## 2018-04-10 DIAGNOSIS — E78.5 HYPERLIPIDEMIA, UNSPECIFIED HYPERLIPIDEMIA TYPE: ICD-10-CM

## 2018-04-10 DIAGNOSIS — R79.89 ELEVATED TROPONIN: Primary | ICD-10-CM

## 2018-04-10 DIAGNOSIS — E44.1 MILD MALNUTRITION: ICD-10-CM

## 2018-04-10 DIAGNOSIS — F02.80 ALZHEIMER'S DEMENTIA WITHOUT BEHAVIORAL DISTURBANCE, UNSPECIFIED TIMING OF DEMENTIA ONSET: ICD-10-CM

## 2018-04-10 DIAGNOSIS — I50.9 CHF (CONGESTIVE HEART FAILURE): ICD-10-CM

## 2018-04-10 LAB
ALBUMIN SERPL BCP-MCNC: 2.7 G/DL
ALP SERPL-CCNC: 65 U/L
ALT SERPL W/O P-5'-P-CCNC: 10 U/L
ANION GAP SERPL CALC-SCNC: 10 MMOL/L
AST SERPL-CCNC: 13 U/L
BASOPHILS # BLD AUTO: 0.1 K/UL
BASOPHILS NFR BLD: 1.2 %
BILIRUB SERPL-MCNC: 0.5 MG/DL
BNP SERPL-MCNC: 553 PG/ML
BUN SERPL-MCNC: 21 MG/DL
CALCIUM SERPL-MCNC: 8.7 MG/DL
CHLORIDE SERPL-SCNC: 107 MMOL/L
CK MB SERPL-MCNC: 9.1 NG/ML
CK MB SERPL-RTO: 9.2 %
CK SERPL-CCNC: 99 U/L
CO2 SERPL-SCNC: 26 MMOL/L
CREAT SERPL-MCNC: 1.5 MG/DL
DIFFERENTIAL METHOD: ABNORMAL
EOSINOPHIL # BLD AUTO: 0.1 K/UL
EOSINOPHIL NFR BLD: 2 %
ERYTHROCYTE [DISTWIDTH] IN BLOOD BY AUTOMATED COUNT: 14.7 %
EST. GFR  (AFRICAN AMERICAN): 47 ML/MIN/1.73 M^2
EST. GFR  (NON AFRICAN AMERICAN): 41 ML/MIN/1.73 M^2
GLUCOSE SERPL-MCNC: 220 MG/DL
HCT VFR BLD AUTO: 37.3 %
HGB BLD-MCNC: 12.6 G/DL
LYMPHOCYTES # BLD AUTO: 0.4 K/UL
LYMPHOCYTES NFR BLD: 5.3 %
MCH RBC QN AUTO: 32.5 PG
MCHC RBC AUTO-ENTMCNC: 33.8 G/DL
MCV RBC AUTO: 96 FL
MONOCYTES # BLD AUTO: 0.2 K/UL
MONOCYTES NFR BLD: 3.3 %
NEUTROPHILS # BLD AUTO: 5.9 K/UL
NEUTROPHILS NFR BLD: 88.2 %
PLATELET # BLD AUTO: 131 K/UL
PMV BLD AUTO: 8.7 FL
POTASSIUM SERPL-SCNC: 3.9 MMOL/L
PROT SERPL-MCNC: 5.6 G/DL
RBC # BLD AUTO: 3.88 M/UL
SODIUM SERPL-SCNC: 143 MMOL/L
TROPONIN I SERPL DL<=0.01 NG/ML-MCNC: 0.38 NG/ML
TROPONIN I SERPL DL<=0.01 NG/ML-MCNC: 1.26 NG/ML
TROPONIN I SERPL DL<=0.01 NG/ML-MCNC: 1.26 NG/ML
TSH SERPL DL<=0.005 MIU/L-ACNC: 1.17 UIU/ML
WBC # BLD AUTO: 6.7 K/UL

## 2018-04-10 PROCEDURE — 82550 ASSAY OF CK (CPK): CPT | Mod: 91

## 2018-04-10 PROCEDURE — 99223 1ST HOSP IP/OBS HIGH 75: CPT | Mod: AI,,, | Performed by: INTERNAL MEDICINE

## 2018-04-10 PROCEDURE — 93005 ELECTROCARDIOGRAM TRACING: CPT

## 2018-04-10 PROCEDURE — 93010 ELECTROCARDIOGRAM REPORT: CPT | Mod: ,,, | Performed by: INTERNAL MEDICINE

## 2018-04-10 PROCEDURE — 99285 EMERGENCY DEPT VISIT HI MDM: CPT

## 2018-04-10 PROCEDURE — 36415 COLL VENOUS BLD VENIPUNCTURE: CPT

## 2018-04-10 PROCEDURE — 12000002 HC ACUTE/MED SURGE SEMI-PRIVATE ROOM

## 2018-04-10 PROCEDURE — 25000003 PHARM REV CODE 250: Performed by: INTERNAL MEDICINE

## 2018-04-10 PROCEDURE — G0378 HOSPITAL OBSERVATION PER HR: HCPCS

## 2018-04-10 PROCEDURE — 83880 ASSAY OF NATRIURETIC PEPTIDE: CPT

## 2018-04-10 PROCEDURE — 25000003 PHARM REV CODE 250: Performed by: EMERGENCY MEDICINE

## 2018-04-10 PROCEDURE — 80053 COMPREHEN METABOLIC PANEL: CPT

## 2018-04-10 PROCEDURE — 82553 CREATINE MB FRACTION: CPT | Mod: 91

## 2018-04-10 PROCEDURE — 84484 ASSAY OF TROPONIN QUANT: CPT

## 2018-04-10 PROCEDURE — 84443 ASSAY THYROID STIM HORMONE: CPT

## 2018-04-10 PROCEDURE — 63600175 PHARM REV CODE 636 W HCPCS: Performed by: INTERNAL MEDICINE

## 2018-04-10 PROCEDURE — 85025 COMPLETE CBC W/AUTO DIFF WBC: CPT

## 2018-04-10 RX ORDER — SIMVASTATIN 10 MG/1
10 TABLET, FILM COATED ORAL NIGHTLY
Status: DISCONTINUED | OUTPATIENT
Start: 2018-04-10 | End: 2018-04-12 | Stop reason: HOSPADM

## 2018-04-10 RX ORDER — ESCITALOPRAM OXALATE 10 MG/1
10 TABLET ORAL DAILY
Status: DISCONTINUED | OUTPATIENT
Start: 2018-04-11 | End: 2018-04-12 | Stop reason: HOSPADM

## 2018-04-10 RX ORDER — ASPIRIN 325 MG
325 TABLET ORAL
Status: COMPLETED | OUTPATIENT
Start: 2018-04-10 | End: 2018-04-10

## 2018-04-10 RX ORDER — ALLOPURINOL 100 MG/1
100 TABLET ORAL 2 TIMES DAILY
Status: DISCONTINUED | OUTPATIENT
Start: 2018-04-10 | End: 2018-04-12 | Stop reason: HOSPADM

## 2018-04-10 RX ORDER — AMOXICILLIN 500 MG
1 CAPSULE ORAL DAILY
COMMUNITY

## 2018-04-10 RX ORDER — AMOXICILLIN 500 MG
1 CAPSULE ORAL DAILY
Status: DISCONTINUED | OUTPATIENT
Start: 2018-04-11 | End: 2018-04-11 | Stop reason: CLARIF

## 2018-04-10 RX ORDER — CHOLECALCIFEROL (VITAMIN D3) 25 MCG
2000 TABLET ORAL DAILY
Status: DISCONTINUED | OUTPATIENT
Start: 2018-04-11 | End: 2018-04-12 | Stop reason: HOSPADM

## 2018-04-10 RX ORDER — ENOXAPARIN SODIUM 100 MG/ML
40 INJECTION SUBCUTANEOUS
Status: DISCONTINUED | OUTPATIENT
Start: 2018-04-10 | End: 2018-04-11

## 2018-04-10 RX ORDER — SODIUM CHLORIDE 0.9 % (FLUSH) 0.9 %
3 SYRINGE (ML) INJECTION EVERY 8 HOURS
Status: DISCONTINUED | OUTPATIENT
Start: 2018-04-10 | End: 2018-04-12 | Stop reason: HOSPADM

## 2018-04-10 RX ORDER — TAMSULOSIN HYDROCHLORIDE 0.4 MG/1
1 CAPSULE ORAL
Status: DISCONTINUED | OUTPATIENT
Start: 2018-04-11 | End: 2018-04-12 | Stop reason: HOSPADM

## 2018-04-10 RX ORDER — GEMFIBROZIL 600 MG/1
600 TABLET, FILM COATED ORAL NIGHTLY
Status: DISCONTINUED | OUTPATIENT
Start: 2018-04-10 | End: 2018-04-12 | Stop reason: HOSPADM

## 2018-04-10 RX ADMIN — SIMVASTATIN 10 MG: 10 TABLET, FILM COATED ORAL at 08:04

## 2018-04-10 RX ADMIN — ASPIRIN 325 MG ORAL TABLET 325 MG: 325 PILL ORAL at 12:04

## 2018-04-10 RX ADMIN — ALLOPURINOL 100 MG: 100 TABLET ORAL at 08:04

## 2018-04-10 RX ADMIN — ENOXAPARIN SODIUM 40 MG: 100 INJECTION SUBCUTANEOUS at 08:04

## 2018-04-10 RX ADMIN — GEMFIBROZIL 600 MG: 600 TABLET ORAL at 08:04

## 2018-04-10 NOTE — ED TRIAGE NOTES
Wife reports pt was sitting watching tv when he started to have jerky movement, not respond, not breath, and turn blue around his lips. EMS was called and report no postictal state. States pt appeared with labored breathing and diaphoresis. Family notes a gradual decline over the last few weeks. Pt AAO x 4 and unable to recall events. Pt states he currently doesn't feel SOB.

## 2018-04-10 NOTE — ED PROVIDER NOTES
Encounter Date: 4/10/2018    SCRIBE #1 NOTE: I, Gabbi Maloney, am scribing for, and in the presence of, Dr. Marinelli.       History     Chief Complaint   Patient presents with    Fatigue       04/10/2018 12:07 PM     Chief complaint: Shortness of breath      Blake Bradley is a 89 y.o. male with HTN and HLD who presents to the ED via EMS with nasal cannula in place with an onset of SOB. Per EMS, 911 was activated for seizure-like activity; however, EMS states that he was diaphoretic with labored breathing but not postictal. The patient had an oxygen saturation in the high 90's and a CBG of 199 with an EKG showing some depressions. Currently, the SOB has resolved. He had a recent procedure to remove a mass on his back performed by Dr. Roberto Matthews 2-3 weeks ago on 3/22. The patient denies cardiac history, being on cardiac medication, fevers, sore throat, cough, chest pain, nausea, vomiting, abdominal pain, blood in stool, hematuria, or any other symptoms at this time. No pertinent SHx noted.       The history is provided by the patient and the EMS personnel.     Review of patient's allergies indicates:   Allergen Reactions    Augmentin [amoxicillin-pot clavulanate]     Clindamycin     Iodine and iodide containing products     Pcn [penicillins]      Past Medical History:   Diagnosis Date    Depression     Disorder of kidney and ureter     renal stones and cysts    Gout     Hyperlipidemia     Hypertension     Kidney stones     Macular degeneration     Psoriasis     PVD (peripheral vascular disease)      Past Surgical History:   Procedure Laterality Date    back mass excision      Laft knee      50 years ago; repair     History reviewed. No pertinent family history.  Social History   Substance Use Topics    Smoking status: Former Smoker    Smokeless tobacco: Never Used      Comment: quit 65 years ago    Alcohol use No     Review of Systems   Constitutional: Positive for diaphoresis (resolved).  Negative for fever.   HENT: Negative for sore throat.    Eyes: Negative for visual disturbance.   Respiratory: Positive for shortness of breath (resolved). Negative for cough.    Cardiovascular: Negative for chest pain.   Gastrointestinal: Negative for abdominal pain, blood in stool, nausea and vomiting.   Genitourinary: Negative for hematuria.   Musculoskeletal: Negative for back pain and neck pain.   Skin: Negative for rash.   Neurological: Negative for syncope and headaches.     Physical Exam     Vitals:    04/10/18 1402   BP: (!) 96/55   Pulse: 77   Resp:    Temp:      Physical Exam    Nursing note and vitals reviewed.  Constitutional: He appears well-developed and well-nourished. He is not diaphoretic. No distress.   [place].    HENT:   Head: Normocephalic and atraumatic.   Eyes: EOM are normal. Pupils are equal, round, and reactive to light.   Neck: Normal range of motion. Neck supple.   Cardiovascular: Normal rate, regular rhythm, normal heart sounds and intact distal pulses. Exam reveals no gallop and no friction rub.    No murmur heard.  Pulmonary/Chest: Breath sounds normal. No respiratory distress. He has no wheezes. He has no rhonchi. He has no rales.   Abdominal: Soft. Bowel sounds are normal. There is no tenderness. There is no rebound and no guarding.   Musculoskeletal: Normal range of motion.   Neurological: He is alert and oriented to person, place, and time.   Skin: Skin is warm.       ED Course   Procedures  Labs Reviewed   CBC W/ AUTO DIFFERENTIAL - Abnormal; Notable for the following:        Result Value    RBC 3.88 (*)     Hemoglobin 12.6 (*)     Hematocrit 37.3 (*)     MCH 32.5 (*)     RDW 14.7 (*)     Platelets 131 (*)     MPV 8.7 (*)     Lymph # 0.4 (*)     Mono # 0.2 (*)     Gran% 88.2 (*)     Lymph% 5.3 (*)     Mono% 3.3 (*)     All other components within normal limits   COMPREHENSIVE METABOLIC PANEL - Abnormal; Notable for the following:     Glucose 220 (*)     Creatinine 1.5 (*)     Total  Protein 5.6 (*)     Albumin 2.7 (*)     eGFR if  47 (*)     eGFR if non  41 (*)     All other components within normal limits   TROPONIN I - Abnormal; Notable for the following:     Troponin I 0.381 (*)     All other components within normal limits   B-TYPE NATRIURETIC PEPTIDE - Abnormal; Notable for the following:      (*)     All other components within normal limits      Imaging Results          X-Ray Chest AP Portable (Final result)  Result time 04/10/18 12:44:19    Final result by Isa Wiley MD (04/10/18 12:44:19)                 Impression:      Single view demonstrating possible alveolar consolidation in the left medial lung base.  Consider pneumonia in the appropriate clinical setting.  PA and lateral views may be of value.    Cardiomegaly and bibasilar atelectasis also noted.      Electronically signed by: Isa Wiley MD  Date:    04/10/2018  Time:    12:44             Narrative:    EXAMINATION:  XR CHEST AP PORTABLE    CLINICAL HISTORY:  Chest Pain;    TECHNIQUE:  Single, AP chest radiograph.    COMPARISON:  None.    FINDINGS:  Cardiomegaly is noted.  Possible abnormal alveolar opacity in the left medial lung base.  Additional linear opacities in the lung bases are most likely reflective of atelectasis.  Possible small right pleural effusion.  No pneumothorax is seen.  Atherosclerosis is seen in the aortic arch.                              EKG Readings: (Independently Interpreted)   Initial Reading: No STEMI.   Sinus rhythm at a rate of 85 bpm with normal axis. T-wave flattening in inferior and lateral leads. Otherwise, normal.      X-Rays:   Independently Interpreted Readings:   Chest X-Ray: Normal heart size.  No infiltrates.  No acute abnormalities. Enlarged heart. No infiltrates. No pneumothorax.      Medical Decision Making:   History:   Old Medical Records: I decided to obtain old medical records.  Initial Assessment:   89-year-old male  presented with a chief complaint of dyspnea.  Differential Diagnosis:   My differential diagnosis includes acute MI, unstable angina, pneumothorax, and cardiac arrhythmia.   Clinical Tests:   Lab Tests: Reviewed and Ordered  Radiological Study: Ordered and Reviewed  Medical Tests: Ordered and Reviewed  ED Management:  The patient was emergently evaluated in the emergency Department, his evaluation was significant for an elderly male with a normal lung exam.  The patient's EKG showed no acute abnormalities per my independent interpretation.  The patient's x-ray showed no acute abnormalities per my independent interpretation.  The patient's labs were significant for mildly elevated creatinine and an elevated troponin.  The patient was treated with an aspirin in the emergency department.  On repeat examination the patient reports that he feels fine and has no complaints.  I'm concerned about a cardiac origin for the patient's symptoms.  I discussed the case with the interventional cardiologist on-call, who is recommending serial troponins, neck oh, and a chemical stress test here at [place].  He does not believe the patient needs a cardiac catheterization at present.  I discussed the case with the hospitalist on-call, Dr. Partida.  He has accepted the patient for admission.            Scribe Attestation:   Scribe #1: I performed the above scribed service and the documentation accurately describes the services I performed. I attest to the accuracy of the note.    Attending Attestation:             Attending ED Notes:   1:46 PM  Per daughter, the patient was laying in bed, diaphoretic, and very short of breath lasting a few minutes during this episode.     2:52 PM  Spoke with Dr. Chacon from Cardiology.     2:56 PM  Spoke with Dr. Partida from Hospital Medicine.          I, Dr. Dominic Marinelli, personally performed the services described in this documentation. All medical record entries made by the scribe were at my  direction and in my presence.  I have reviewed the chart and agree that the record reflects my personal performance and is accurate and complete. Dominic Marinelli MD.  3:07 PM 04/10/2018       Clinical Impression:     1. Elevated troponin    2. SOB (shortness of breath)    3. Dyspnea, unspecified type          Disposition:   Disposition: Admitted                        Dominic Marinelli MD  04/10/18 1166

## 2018-04-10 NOTE — H&P
PCP: Justino Sy MD    History & Physical    Chief Complaint: Episode of diaphoresis and SOB    History of Present Illness:  Patient is a 89 y.o. male admitted to Hospitalist Service from [facility] Emergency Room with complaint of episode of diaphoresis and SOB. Patient reportedly has past medical history significant for Alzheimer's dementia, reduced hearing acquity, hypertension, hyperlipidemia, nephrolithiasis, PVD and history of seizure. Part of the history obtained from wife who is at the bedside. Patient presented via EMS with nasal cannula in place with an onset of SOB. Per EMS, 911 was activated for seizure-like activity; however, EMS states that he was diaphoretic with labored breathing but not postictal. The patient had an oxygen saturation in the high 90's and a CBG of 199 with an EKG showing some depressions. Currently, the SOB has resolved. He had a recent procedure to remove a mass on his back performed by Dr. Roberto Matthews 2-3 weeks ago on 3/22/18. Patient denied abdominal pain, nausea, vomiting, headache, vision changes, focal neuro-deficits, cough or fever.    Past Medical History:   Diagnosis Date    Alzheimer's dementia     Depression     Disorder of kidney and ureter     renal stones and cysts    Gout     Hyperlipidemia     Hypertension     Kidney stones     Macular degeneration     Psoriasis     PVD (peripheral vascular disease)     Seizures      Past Surgical History:   Procedure Laterality Date    back mass excision Right 2018    EYE SURGERY Right     cataract lazer    Laft knee      50 years ago; repair     Family History   Problem Relation Age of Onset    Cancer Mother      breast    Brain Hemorrhage Father     Heart disease Brother      had heart transplant later  of heart problems    No Known Problems Daughter     No Known Problems Son      Social History   Substance Use Topics    Smoking status: Former Smoker     Quit date:      Smokeless tobacco: Never Used      Comment: quit 65 years ago    Alcohol use No      Review of patient's allergies indicates:   Allergen Reactions    Augmentin [amoxicillin-pot clavulanate]     Clindamycin     Iodine and iodide containing products     Pcn [penicillins]      PTA Medications   Medication Sig    allopurinol (ZYLOPRIM) 100 MG tablet Take 100 mg by mouth 2 (two) times daily.     cholecalciferol, vitamin D3, 2,000 unit Tab Take 2,000 Units by mouth once daily.     donepezil (ARICEPT) 10 MG tablet Take 10 mg by mouth every evening.     escitalopram oxalate (LEXAPRO) 10 MG tablet TAKE ONE TABLET BY MOUTH ONCE DAILY    fish oil-omega-3 fatty acids 300-1,000 mg capsule Take 1 capsule by mouth once daily.    gemfibrozil (LOPID) 600 MG tablet Take 600 mg by mouth every evening.     losartan-hydrochlorothiazide 50-12.5 mg (HYZAAR) 50-12.5 mg per tablet TAKE ONE TABLET BY MOUTH ONCE DAILY    simvastatin (ZOCOR) 10 MG tablet Take 10 mg by mouth every evening.     tamsulosin (FLOMAX) 0.4 mg Cp24 TAKE ONE CAPSULE BY MOUTH ONCE DAILY     Review of Systems:  Constitutional: no fever or chills  Eyes: no visual changes  Ears, nose, mouth, throat, and face: no nasal congestion or sore throat  Respiratory: see HPI  Cardiovascular: see HPI  Gastrointestinal: no nausea or vomiting, no abdominal pain or change in bowel habits  Genitourinary: no hematuria or dysuria  Integument/breast: no rash or pruritis  Hematologic/lymphatic: no easy bruising or lymphadenopathy  Musculoskeletal: no arthralgias or myalgias  Neurological: no seizures or tremors.  Behavioral/Psych: no auditory or visual hallucinations  Endocrine: no heat or cold intolerance     OBJECTIVE:     Vital Signs (Most Recent)  Temp: 96.2 °F (35.7 °C) (04/10/18 1632)  Pulse: 74 (04/10/18 1632)  Resp: 16 (04/10/18 1632)  BP: (!) 98/56 (04/10/18 1632)  SpO2: 98 % (04/10/18 1632)    Physical Exam:  General appearance: well developed, appears stated  age  Head: normocephalic, atraumatic  Eyes:  conjunctivae/corneas clear. PERRL.  Nose: Nares normal. Septum midline.  Throat: lips, mucosa, and tongue normal; teeth and gums normal, no throat erythema.  Neck: supple, symmetrical, trachea midline, no JVD and thyroid not enlarged, symmetric, no tenderness/mass/nodules  Lungs:  clear to auscultation bilaterally and normal respiratory effort  Chest wall: no tenderness  Heart: regular rate and rhythm, S1, S2 normal, no murmur, click, rub or gallop  Abdomen: soft, non-tender non-distented; bowel sounds normal; no masses,  no organomegaly  Extremities: no cyanosis, clubbing or edema.   Pulses: 2+ and symmetric  Skin: Skin color, texture, turgor normal. Right lower posterior back with a ORQUIDEA drain inplace.  Lymph nodes: Cervical, supraclavicular, and axillary nodes normal.  Neurologic: Normal strength and tone. No focal numbness or weakness. CNII-XII intact.      Laboratory:   CBC:   Recent Labs  Lab 04/10/18  1253   WBC 6.70   RBC 3.88*   HGB 12.6*   HCT 37.3*   *   MCV 96   MCH 32.5*   MCHC 33.8   BNP: 553    CMP:   Recent Labs  Lab 04/10/18  1253   *   CALCIUM 8.7   ALBUMIN 2.7*   PROT 5.6*      K 3.9   CO2 26      BUN 21   CREATININE 1.5*   ALKPHOS 65   ALT 10   AST 13   BILITOT 0.5   Cardiac markers:   Recent Labs  Lab 04/10/18  1253   TROPONINI 0.381*     Diagnostic Results:  Chest X-Ray:     EKG: NSR, 85, non-specific St and TW abnormality.    Assessment/Plan:     Active Hospital Problems    Diagnosis  POA    *Elevated troponin [R74.8]  Possible diastolic CHF  Supplemental O2 via nasal canula; titrate O2 saturation to >92%.  Serial Cardiac enzymes × 3. Obtain 2 D ECHO  Tele-monitoring.   Cardiology Consultation.  Check fasting lipid panel and EKG in AM.  Use Nitroglycerine PRN Chest pain.  Na restriction <2g/d.    Yes    Alzheimer's dementia [G30.9]  Dementia is controlled currently. Continue home dementia meds and non-pharmacologic  interventions to prevent delirium (No VS between 11PM-5AM, activity during day, opening blinds, providing glasses/hearing aids, and up in chair during daytime). Use PRN anti-psychotics to prevent behavior of self harm during sundowning, and avoid narcotics and benzos unless absolutely necessary. PRN anti-psychotics prescribed to avoid self harm behaviors.    Yes    Mild malnutrition [E44.1]  Nutrition consulted. Encourage maximal PO intake. Diet supplementation ordered per nutrition approval. Will encourage PO and monitor closely for weight changes.    Yes    Hyperlipidemia [E78.5]  Chronic problem. Check lipid profile. Will continue chronic medications and monitor for any changes, adjusting as needed.    Yes    Hypertension [I10]  Hold anti-HTN medications as BP is lower.    Yes    Mass on back [R22.2]  Patient need to have ORQUIDEA drain to be removed by Dr. ANABELLA Matthews.   Yes        Discussed with patient's wife.     VTE Risk Mitigation         Ordered     enoxaparin injection 40 mg  Every 24 hours (non-standard times)     Route:  Subcutaneous        04/10/18 1755     IP VTE HIGH RISK PATIENT  Once      04/10/18 1552     Place RED hose  Until discontinued      04/10/18 1552     Place sequential compression device  Until discontinued      04/10/18 1552        Damari Partida MD  Department of Hospital Medicine   [facility]

## 2018-04-10 NOTE — NURSING
Pt came to floor soiled. ER tech offered to stay to help clean. Pt has a ORQUIDEA drain 1/2 full of serosanguinous fluid to right back. Wife states he had surgery last month on 22th by Dr SUNG Muro for a tumor removal. Follow up was last week but they were unable to go. Appointment was rescheduled for tomorrow 4/11/18

## 2018-04-11 LAB
ANION GAP SERPL CALC-SCNC: 11 MMOL/L
ANION GAP SERPL CALC-SCNC: 11 MMOL/L
AORTIC ROOT ANNULUS: 3.12 CM
AORTIC VALVE CUSP SEPERATION: 1.87 CM
AV MEAN GRADIENT: 1.94 MMHG
AV VALVE AREA: 3.17 CM2
BASOPHILS # BLD AUTO: 0 K/UL
BASOPHILS NFR BLD: 0.5 %
BSA FOR ECHO PROCEDURE: 2.12 M2
BUN SERPL-MCNC: 23 MG/DL
BUN SERPL-MCNC: 23 MG/DL
CALCIUM SERPL-MCNC: 8.3 MG/DL
CALCIUM SERPL-MCNC: 8.3 MG/DL
CHLORIDE SERPL-SCNC: 108 MMOL/L
CHLORIDE SERPL-SCNC: 108 MMOL/L
CHOLEST SERPL-MCNC: 131 MG/DL
CHOLEST/HDLC SERPL: 4 {RATIO}
CK MB SERPL-MCNC: 2.9 NG/ML
CK MB SERPL-MCNC: 3.7 NG/ML
CK MB SERPL-MCNC: 4.1 NG/ML
CK MB SERPL-MCNC: 5.9 NG/ML
CK MB SERPL-MCNC: 6.7 NG/ML
CK MB SERPL-RTO: 3.3 %
CK MB SERPL-RTO: 4.1 %
CK MB SERPL-RTO: 4.4 %
CK MB SERPL-RTO: 4.9 %
CK MB SERPL-RTO: 6.7 %
CK SERPL-CCNC: 100 U/L
CK SERPL-CCNC: 120 U/L
CK SERPL-CCNC: 87 U/L
CK SERPL-CCNC: 90 U/L
CK SERPL-CCNC: 93 U/L
CO2 SERPL-SCNC: 20 MMOL/L
CO2 SERPL-SCNC: 20 MMOL/L
CREAT SERPL-MCNC: 1.3 MG/DL
CREAT SERPL-MCNC: 1.3 MG/DL
CV ECHO LV RWT: 0.36 CM
DIFFERENTIAL METHOD: ABNORMAL
DOP CALC AO PEAK VEL: 0.89 M/S
DOP CALC AO VTI: 0.17 CM
DOP CALC LVOT AREA: 3.17 CM2
DOP CALC LVOT DIAMETER: 2.01 CM
DOP CALC LVOT STROKE VOLUME: 0.54 CM3
DOP CALCLVOT PEAK VEL VTI: 0.17 CM
E WAVE DECELERATION TIME: 208.58 MSEC
E/A RATIO: 0.66
E/E' RATIO: 8.57
ECHO EF ESTIMATED: 35 %
ECHO LV POSTERIOR WALL: 0.72 CM (ref 0.6–1.1)
EOSINOPHIL # BLD AUTO: 0.3 K/UL
EOSINOPHIL NFR BLD: 4.7 %
ERYTHROCYTE [DISTWIDTH] IN BLOOD BY AUTOMATED COUNT: 14.4 %
EST. GFR  (AFRICAN AMERICAN): 56 ML/MIN/1.73 M^2
EST. GFR  (AFRICAN AMERICAN): 56 ML/MIN/1.73 M^2
EST. GFR  (NON AFRICAN AMERICAN): 48 ML/MIN/1.73 M^2
EST. GFR  (NON AFRICAN AMERICAN): 48 ML/MIN/1.73 M^2
FRACTIONAL SHORTENING: 17 % (ref 28–44)
GLUCOSE SERPL-MCNC: 93 MG/DL
GLUCOSE SERPL-MCNC: 93 MG/DL
HCT VFR BLD AUTO: 33.8 %
HDLC SERPL-MCNC: 33 MG/DL
HDLC SERPL: 25.2 %
HGB BLD-MCNC: 11.2 G/DL
INTERVENTRICULAR SEPTUM: 1 CM (ref 0.6–1.1)
IVRT: 0.12 MSEC
LDLC SERPL CALC-MCNC: 81.6 MG/DL
LEFT ATRIUM SIZE: 4.41 CM
LEFT INTERNAL DIMENSION IN SYSTOLE: 3.97 CM (ref 2.1–4)
LEFT VENTRICULAR INTERNAL DIMENSION IN DIASTOLE: 4.76 CM (ref 3.5–6)
LV LATERAL E/E' RATIO: 7.5
LV SEPTAL E/E' RATIO: 10
LYMPHOCYTES # BLD AUTO: 0.7 K/UL
LYMPHOCYTES NFR BLD: 11.8 %
MAGNESIUM SERPL-MCNC: 2.3 MG/DL
MCH RBC QN AUTO: 32.2 PG
MCHC RBC AUTO-ENTMCNC: 33.3 G/DL
MCV RBC AUTO: 97 FL
MONOCYTES # BLD AUTO: 0.5 K/UL
MONOCYTES NFR BLD: 7.7 %
MV PEAK A VEL: 0.91 M/S
MV PEAK E VEL: 0.6 M/S
MV STENOSIS PRESSURE HALF TIME: 60.49 MS
MV VALVE AREA P 1/2 METHOD: 3.64 CM2
NEUTROPHILS # BLD AUTO: 4.6 K/UL
NEUTROPHILS NFR BLD: 75.3 %
NONHDLC SERPL-MCNC: 98 MG/DL
PISA TR MAX VEL: 2.24 M/S
PLATELET # BLD AUTO: 131 K/UL
PMV BLD AUTO: 8.4 FL
POTASSIUM SERPL-SCNC: 4.2 MMOL/L
POTASSIUM SERPL-SCNC: 4.2 MMOL/L
PV PEAK GRADIENT: 2.26 MMHG
PV PEAK VELOCITY: 0.75 CM/S
RA PRESSURE: 3 MMHG
RBC # BLD AUTO: 3.49 M/UL
RIGHT VENTRICULAR END-DIASTOLIC DIMENSION: 2.98 CM
SODIUM SERPL-SCNC: 139 MMOL/L
SODIUM SERPL-SCNC: 139 MMOL/L
TDI LATERAL: 0.08
TDI SEPTAL: 0.06
TDI: 0.07
TR MAX PG: 20.04 MMHG
TRIGL SERPL-MCNC: 82 MG/DL
TROPONIN I SERPL DL<=0.01 NG/ML-MCNC: 0.97 NG/ML
TROPONIN I SERPL DL<=0.01 NG/ML-MCNC: 1.04 NG/ML
TROPONIN I SERPL DL<=0.01 NG/ML-MCNC: 1.65 NG/ML
TROPONIN I SERPL DL<=0.01 NG/ML-MCNC: 1.65 NG/ML
TV REST PULMONARY ARTERY PRESSURE: 23.04 MMHG
WBC # BLD AUTO: 6.1 K/UL

## 2018-04-11 PROCEDURE — 25000003 PHARM REV CODE 250: Performed by: INTERNAL MEDICINE

## 2018-04-11 PROCEDURE — 80048 BASIC METABOLIC PNL TOTAL CA: CPT

## 2018-04-11 PROCEDURE — 80061 LIPID PANEL: CPT

## 2018-04-11 PROCEDURE — 36415 COLL VENOUS BLD VENIPUNCTURE: CPT

## 2018-04-11 PROCEDURE — 82553 CREATINE MB FRACTION: CPT | Mod: 91

## 2018-04-11 PROCEDURE — 99205 OFFICE O/P NEW HI 60 MIN: CPT | Mod: ,,, | Performed by: PSYCHIATRY & NEUROLOGY

## 2018-04-11 PROCEDURE — 85025 COMPLETE CBC W/AUTO DIFF WBC: CPT

## 2018-04-11 PROCEDURE — 25000003 PHARM REV CODE 250: Performed by: EMERGENCY MEDICINE

## 2018-04-11 PROCEDURE — 93010 ELECTROCARDIOGRAM REPORT: CPT | Mod: ,,, | Performed by: INTERNAL MEDICINE

## 2018-04-11 PROCEDURE — 95816 EEG AWAKE AND DROWSY: CPT | Mod: 26,,, | Performed by: PSYCHIATRY & NEUROLOGY

## 2018-04-11 PROCEDURE — 82550 ASSAY OF CK (CPK): CPT | Mod: 91

## 2018-04-11 PROCEDURE — 99226 PR SUBSEQUENT OBSERVATION CARE,LEVEL III: CPT | Mod: ,,, | Performed by: INTERNAL MEDICINE

## 2018-04-11 PROCEDURE — 84484 ASSAY OF TROPONIN QUANT: CPT

## 2018-04-11 PROCEDURE — G0378 HOSPITAL OBSERVATION PER HR: HCPCS

## 2018-04-11 PROCEDURE — A4216 STERILE WATER/SALINE, 10 ML: HCPCS | Performed by: EMERGENCY MEDICINE

## 2018-04-11 PROCEDURE — 63600175 PHARM REV CODE 636 W HCPCS: Performed by: INTERNAL MEDICINE

## 2018-04-11 PROCEDURE — 93005 ELECTROCARDIOGRAM TRACING: CPT

## 2018-04-11 PROCEDURE — 83735 ASSAY OF MAGNESIUM: CPT

## 2018-04-11 RX ORDER — GLUCOSAM/CHONDRO/HERB 149/HYAL 750-100 MG
1 TABLET ORAL DAILY
Status: DISCONTINUED | OUTPATIENT
Start: 2018-04-11 | End: 2018-04-12 | Stop reason: HOSPADM

## 2018-04-11 RX ORDER — ENOXAPARIN SODIUM 100 MG/ML
1 INJECTION SUBCUTANEOUS
Status: DISCONTINUED | OUTPATIENT
Start: 2018-04-11 | End: 2018-04-12

## 2018-04-11 RX ORDER — RAMELTEON 8 MG/1
8 TABLET ORAL NIGHTLY PRN
Status: DISCONTINUED | OUTPATIENT
Start: 2018-04-11 | End: 2018-04-12 | Stop reason: HOSPADM

## 2018-04-11 RX ADMIN — VITAMIN D, TAB 1000IU (100/BT) 2000 UNITS: 25 TAB at 01:04

## 2018-04-11 RX ADMIN — ALLOPURINOL 100 MG: 100 TABLET ORAL at 01:04

## 2018-04-11 RX ADMIN — ESCITALOPRAM 10 MG: 10 TABLET, FILM COATED ORAL at 01:04

## 2018-04-11 RX ADMIN — SIMVASTATIN 10 MG: 10 TABLET, FILM COATED ORAL at 08:04

## 2018-04-11 RX ADMIN — SODIUM CHLORIDE, PRESERVATIVE FREE 3 ML: 5 INJECTION INTRAVENOUS at 10:04

## 2018-04-11 RX ADMIN — GEMFIBROZIL 600 MG: 600 TABLET ORAL at 08:04

## 2018-04-11 RX ADMIN — SODIUM CHLORIDE, PRESERVATIVE FREE 3 ML: 5 INJECTION INTRAVENOUS at 01:04

## 2018-04-11 RX ADMIN — ENOXAPARIN SODIUM 90 MG: 100 INJECTION SUBCUTANEOUS at 01:04

## 2018-04-11 RX ADMIN — OMEGA-3 FATTY ACIDS CAP 1000 MG 1 CAPSULE: 1000 CAP at 01:04

## 2018-04-11 RX ADMIN — ALLOPURINOL 100 MG: 100 TABLET ORAL at 08:04

## 2018-04-11 RX ADMIN — RAMELTEON 8 MG: 8 TABLET, FILM COATED ORAL at 10:04

## 2018-04-11 RX ADMIN — ENOXAPARIN SODIUM 90 MG: 100 INJECTION SUBCUTANEOUS at 10:04

## 2018-04-11 NOTE — CONSULTS
Date of service:  2018     Chief complaint:  Seizure    History of present illness:  The patient is a 89 y.o. male referred for evaluation of episodes suspicious for seizure. This occurred on the day of admission. There was no aura.  His seizure was characterized by loss of consciousness with generalized stiffening and convulsions.  This component of this spell lasted for approximately 2 minutes.  Afterwards, he was fatigued.  The patient has had no similar events previously.      Past Medical History:   Diagnosis Date    Alzheimer's dementia     Depression     Disorder of kidney and ureter     renal stones and cysts    Gout     Hyperlipidemia     Hypertension     Kidney stones     Macular degeneration     Psoriasis     PVD (peripheral vascular disease)     Seizures        Past Surgical History:   Procedure Laterality Date    back mass excision Right 2018    EYE SURGERY Right     cataract lazer    Laft knee      50 years ago; repair       Family History   Problem Relation Age of Onset    Cancer Mother      breast    Brain Hemorrhage Father     Heart disease Brother      had heart transplant later  of heart problems    No Known Problems Daughter     No Known Problems Son        Social History     Social History    Marital status:      Spouse name: N/A    Number of children: N/A    Years of education: N/A     Occupational History    Not on file.     Social History Main Topics    Smoking status: Former Smoker     Quit date:     Smokeless tobacco: Never Used      Comment: quit 65 years ago    Alcohol use No    Drug use: No    Sexual activity: Not on file     Other Topics Concern    Not on file     Social History Narrative    No narrative on file        Review of patient's allergies indicates:   Allergen Reactions    Augmentin [amoxicillin-pot clavulanate]     Clindamycin     Iodine and iodide containing products     Pcn [penicillins]         Review of  "Systems  A comprehensive review of systems was performed as a part of the patient's admission H&P.  It is noteworthy as above.  It is otherwise noncontributory.  Please see the patient's chart for further details.      Physical exam:  BP (!) 100/59 (BP Location: Right arm, Patient Position: Sitting)   Pulse 85   Temp 98 °F (36.7 °C) (Oral)   Resp 18   Ht 5' 10" (1.778 m)   Wt 90.7 kg (200 lb)   SpO2 (!) 93%   BMI 28.70 kg/m²   General: Well developed, well nourished.  No acute distress.  HEENT: Atraumatic, normocephalic.  Neck: Supple, trachea midline.  Cardiovascular: Regular rate and rhythm.  Pulmonary: No increased work of breathing.  Abdomen/GI: No guarding.  Musculoskeletal: No obvious joint deformities, moves all extremities well.    Neurological exam:  Mental status: Awake and alert.  Oriented x3.  Speech fluent and appropriate.  Recent and remote memory appear to be limited.  Fund of knowledge limited.  Cranial nerves: Pupils equal round and reactive to light, extraocular movements intact, facial strength and sensation intact bilaterally, palate and tongue midline, hearing impaired bilaterally.  Motor: 5 out of 5 strength throughout the upper and lower extremities bilaterally. Normal bulk and tone.  Sensation: Intact to light touch and temperature bilaterally.  DTR: 1+ at the knees and biceps bilaterally.  Coordination: Finger-nose-finger testing intact bilaterally.  Gait: Nonfocal gait.    Data base:  Chart reviewed.    Labs:  BMP: includes cr=1.3  CBC: includes HCT=34 and foq=710    CXR:  "Single view demonstrating possible alveolar consolidation in the left medial lung base.  Consider pneumonia in the appropriate clinical setting.  PA and lateral views may be of value.  Cardiomegaly and bibasilar atelectasis also noted."    EKG:  "Normal sinus rhythm  ST and T wave abnormality, consider lateral ischemia  Abnormal ECG  No previous ECGs available"    Assessment and plan:  The patient is a 89 y.o. " male who presents following a single event worrisome for seizure.  I think a reasonable workup at this point would include MRI of the brain and EEG.  As far as medications go, we will hold off on starting AEDs at this time.  Should the above workup demonstrate a significant risk of recurrent seizures, we would start anticonvulsant medication under those circumstances.  The patient should not drive until seizure free for 6 months, though his cognitive issues likely preclude him from driving in any case.  Seizure safety was also discussed.

## 2018-04-11 NOTE — CONSULTS
Progress Note  Cardiology    Admit Date: 4/10/2018   LOS: 1 day     Follow-up For:  Seizure    Scheduled Meds:   allopurinol  100 mg Oral BID    enoxaparin  1 mg/kg Subcutaneous Q12H    escitalopram oxalate  10 mg Oral Daily    gemfibrozil  600 mg Oral QHS    omega 3-dha-epa-fish oil  1 capsule Oral Daily    simvastatin  10 mg Oral QHS    sodium chloride 0.9%  3 mL Intravenous Q8H    tamsulosin  1 capsule Oral Daily with breakfast    vitamin D  2,000 Units Oral Daily     Continuous Infusions:  PRN Meds:    Review of patient's allergies indicates:   Allergen Reactions    Augmentin [amoxicillin-pot clavulanate]     Clindamycin     Iodine and iodide containing products     Pcn [penicillins]        SUBJECTIVE:     Interval History: Patient has no complaint of cp or sob.    Review of Systems  Respiratory: negative for cough, hemoptysis, sputum and wheezing  Cardiovascular: negative for chest pain, orthopnea, palpitations, paroxysmal nocturnal dyspnea and sob with mild exerton.    OBJECTIVE:     Vital Signs (Most Recent)  Temp: 98 °F (36.7 °C) (04/11/18 1119)  Pulse: 76 (04/11/18 1315)  Resp: 18 (04/11/18 1119)  BP: 91/65 (04/11/18 1315)  SpO2: 97 % (04/11/18 1119)    Vital Signs Range (Last 24H):  Temp:  [96.2 °F (35.7 °C)-98.9 °F (37.2 °C)]   Pulse:  [71-90]   Resp:  [16-18]   BP: ()/(51-65)   SpO2:  [93 %-98 %]       Physical Exam:  Neck: no carotid bruit, no JVD and supple, symmetrical, trachea midline  Lungs: clear to auscultation bilaterally, normal respiratory effort  Heart: regular rate and rhythm, S1, S2 normal, no murmur, click, rub or gallop  Abdomen: soft, non-tender; bowel sounds normal; no masses,  no organomegaly  Extremities: Extremities normal, atraumatic, no cyanosis, clubbing, or edema    Recent Results (from the past 24 hour(s))   Troponin I    Collection Time: 04/10/18  6:08 PM   Result Value Ref Range    Troponin I 1.263 (H) 0.000 - 0.026 ng/mL   CK-MB    Collection Time: 04/10/18   6:08 PM   Result Value Ref Range    CPK 99 20 - 200 U/L    CPK MB 9.1 (H) 0.1 - 6.5 ng/mL    MB% 9.2 (H) 0.0 - 5.0 %   Troponin I    Collection Time: 04/10/18  6:08 PM   Result Value Ref Range    Troponin I 1.263 (H) 0.000 - 0.026 ng/mL   TSH    Collection Time: 04/10/18  6:08 PM   Result Value Ref Range    TSH 1.174 0.400 - 4.000 uIU/mL   Troponin I    Collection Time: 04/10/18 11:34 PM   Result Value Ref Range    Troponin I 1.645 (H) 0.000 - 0.026 ng/mL   CK-MB    Collection Time: 04/10/18 11:34 PM   Result Value Ref Range     20 - 200 U/L    CPK MB 6.7 (H) 0.1 - 6.5 ng/mL    MB% 6.7 (H) 0.0 - 5.0 %   Troponin I    Collection Time: 04/10/18 11:34 PM   Result Value Ref Range    Troponin I 1.645 (H) 0.000 - 0.026 ng/mL   Basic metabolic panel    Collection Time: 04/11/18  4:04 AM   Result Value Ref Range    Sodium 139 136 - 145 mmol/L    Potassium 4.2 3.5 - 5.1 mmol/L    Chloride 108 95 - 110 mmol/L    CO2 20 (L) 23 - 29 mmol/L    Glucose 93 70 - 110 mg/dL    BUN, Bld 23 8 - 23 mg/dL    Creatinine 1.3 0.5 - 1.4 mg/dL    Calcium 8.3 (L) 8.7 - 10.5 mg/dL    Anion Gap 11 8 - 16 mmol/L    eGFR if African American 56 (A) >60 mL/min/1.73 m^2    eGFR if non African American 48 (A) >60 mL/min/1.73 m^2   Basic metabolic panel    Collection Time: 04/11/18  4:04 AM   Result Value Ref Range    Sodium 139 136 - 145 mmol/L    Potassium 4.2 3.5 - 5.1 mmol/L    Chloride 108 95 - 110 mmol/L    CO2 20 (L) 23 - 29 mmol/L    Glucose 93 70 - 110 mg/dL    BUN, Bld 23 8 - 23 mg/dL    Creatinine 1.3 0.5 - 1.4 mg/dL    Calcium 8.3 (L) 8.7 - 10.5 mg/dL    Anion Gap 11 8 - 16 mmol/L    eGFR if African American 56 (A) >60 mL/min/1.73 m^2    eGFR if non African American 48 (A) >60 mL/min/1.73 m^2   CK-MB    Collection Time: 04/11/18  4:04 AM   Result Value Ref Range     20 - 200 U/L    CPK MB 5.9 0.1 - 6.5 ng/mL    MB% 4.9 0.0 - 5.0 %   Lipid panel    Collection Time: 04/11/18  4:04 AM   Result Value Ref Range    Cholesterol 131  120 - 199 mg/dL    Triglycerides 82 30 - 150 mg/dL    HDL 33 (L) 40 - 75 mg/dL    LDL Cholesterol 81.6 63.0 - 159.0 mg/dL    HDL/Chol Ratio 25.2 20.0 - 50.0 %    Total Cholesterol/HDL Ratio 4.0 2.0 - 5.0    Non-HDL Cholesterol 98 mg/dL   Magnesium    Collection Time: 04/11/18  4:04 AM   Result Value Ref Range    Magnesium 2.3 1.6 - 2.6 mg/dL   CBC auto differential    Collection Time: 04/11/18  5:33 AM   Result Value Ref Range    WBC 6.10 3.90 - 12.70 K/uL    RBC 3.49 (L) 4.60 - 6.20 M/uL    Hemoglobin 11.2 (L) 14.0 - 18.0 g/dL    Hematocrit 33.8 (L) 40.0 - 54.0 %    MCV 97 82 - 98 fL    MCH 32.2 (H) 27.0 - 31.0 pg    MCHC 33.3 32.0 - 36.0 g/dL    RDW 14.4 11.5 - 14.5 %    Platelets 131 (L) 150 - 350 K/uL    MPV 8.4 (L) 9.2 - 12.9 fL    Gran # (ANC) 4.6 1.8 - 7.7 K/uL    Lymph # 0.7 (L) 1.0 - 4.8 K/uL    Mono # 0.5 0.3 - 1.0 K/uL    Eos # 0.3 0.0 - 0.5 K/uL    Baso # 0.00 0.00 - 0.20 K/uL    Gran% 75.3 (H) 38.0 - 73.0 %    Lymph% 11.8 (L) 18.0 - 48.0 %    Mono% 7.7 4.0 - 15.0 %    Eosinophil% 4.7 0.0 - 8.0 %    Basophil% 0.5 0.0 - 1.9 %    Differential Method Automated    CK-MB    Collection Time: 04/11/18 11:55 AM   Result Value Ref Range    CPK 93 20 - 200 U/L    CPK MB 4.1 0.1 - 6.5 ng/mL    MB% 4.4 0.0 - 5.0 %   Troponin I    Collection Time: 04/11/18 11:55 AM   Result Value Ref Range    Troponin I 1.036 (H) 0.000 - 0.026 ng/mL   Transthoracic echo (TTE) complete    Collection Time: 04/11/18 12:55 PM   Result Value Ref Range    AV mean gradient 1.94 mmHg    Ao peak spenser 0.89 m/s    Ao VTI 0.17 cm    IVRT 0.12 msec    IVS 1.00 0.6 - 1.1 cm    LA size 4.41 cm    LVIDD 4.76 3.5 - 6.0 cm    LVIDS 3.97 2.1 - 4.0 cm    LVOT diameter 2.01 cm    LVOT peak VTI 0.17 cm    PW 0.72 0.6 - 1.1 cm    MV Peak A Spenser 0.91 m/s    E wave decelartion time 208.58 msec    MV Peak E Spenser 0.60 m/s    RVDD 2.98 cm    TR Max Spenser 2.24 m/s    Ao root annulus 3.12 cm    AORTIC VALVE CUSP SEPERATION 1.87 cm    PV PEAK VELOCITY 0.75 cm/s    PV  peak gradient 2.26 mmHg    Triscuspid Valve Regurgitation Peak Gradient 20.04 mmHg    MV stenosis pressure 1/2 time 60.49 ms    FS 17 %    Left Ventricle Relative Wall Thickness 0.36 cm    AV valve area 3.17 cm2    MV valve area p 1/2 method 3.64 cm2    E/A ratio 0.66     LVOT area 3.17 cm2    LVOT stroke volume 0.54 cm3    BSA 2.12 m2    TDI SEPTAL 0.06     LV LATERAL E/E' RATIO 7.50     LV SEPTAL E/E' RATIO 10.00     TDI LATERAL 0.08     TDI 0.07     E/E' ratio 8.57        Diagnostic Results:  Labs: Reviewed  ECG: Reviewed    ASSESSMENT/PLAN:     Patient was not coherent after the episode for 5 - 10 mins according to wife and son at the bedside.  May need implantable loop recorder. Neuro w/u in progress.

## 2018-04-11 NOTE — PROGRESS NOTES
Progress Note  Hospital Medicine  Patient Name:Blake Bradley  MRN:  [id number]  Patient Class: OP- Observation  Admit Date: 4/10/2018  Length of Stay: 1 days  Expected Discharge Date:   Attending Physician: Damari Partida MD  Primary Care Provider:  Justino Sy MD    SUBJECTIVE:     Principal Problem: Elevated troponin  Initial history of present illness: Patient is a 89 y.o. male admitted to Hospitalist Service from [facility] Emergency Room with complaint of episode of diaphoresis and SOB. Patient reportedly has past medical history significant for Alzheimer's dementia, reduced hearing acquity, hypertension, hyperlipidemia, nephrolithiasis, PVD and history of seizure. Part of the history obtained from wife who is at the bedside. Patient presented via EMS with nasal cannula in place with an onset of SOB. Per EMS, 911 was activated for seizure-like activity; however, EMS states that he was diaphoretic with labored breathing but not postictal. The patient had an oxygen saturation in the high 90's and a CBG of 199 with an EKG showing some depressions. Currently, the SOB has resolved. He had a recent procedure to remove a mass on his back performed by Dr. Roberto Matthews 2-3 weeks ago on 3/22/18. Patient denied abdominal pain, nausea, vomiting, headache, vision changes, focal neuro-deficits, cough or fever.    PMH/PSH/SH/FH/Meds: reviewed.    Symptoms/Review of Systems: Pleasant elderly male with [place]. Wife and son at bedside. No seizure actvity.  No shortness of breath, cough, chest pain or headache, fever or abdominal pain.     Diet:  Adequate intake.    Activity level: Up with assistance  Pain:  Patient reports no pain.       OBJECTIVE:   Vital Signs (Most Recent):      Temp: 98 °F (36.7 °C) (04/11/18 0705)  Pulse: 90 (04/11/18 0705)  Resp: 18 (04/11/18 0705)  BP: 91/65 (04/11/18 0705)  SpO2: 97 % (04/11/18 0705)       Vital Signs Range (Last 24H):  Temp:  [96.2 °F (35.7 °C)-98.9 °F (37.2 °C)]   Pulse:   [71-90]   Resp:  [16-18]   BP: ()/(51-68)   SpO2:  [92 %-98 %]     Weight: 90.7 kg (200 lb)  Body mass index is 28.7 kg/m².    Intake/Output Summary (Last 24 hours) at 04/11/18 1014  Last data filed at 04/11/18 0700   Gross per 24 hour   Intake              120 ml   Output               75 ml   Net               45 ml     Physical Examination:  General appearance: well developed, appears stated age  Head: normocephalic, atraumatic  Eyes:  conjunctivae/corneas clear. PERRL.  Nose: Nares normal. Septum midline.  Throat: lips, mucosa, and tongue normal; teeth and gums normal, no throat erythema.  Neck: supple, symmetrical, trachea midline, no JVD and thyroid not enlarged, symmetric, no tenderness/mass/nodules  Lungs:  clear to auscultation bilaterally and normal respiratory effort  Chest wall: no tenderness  Heart: regular rate and rhythm, S1, S2 normal, no murmur, click, rub or gallop  Abdomen: soft, non-tender non-distented; bowel sounds normal; no masses,  no organomegaly  Extremities: no cyanosis, clubbing or edema.   Pulses: 2+ and symmetric  Skin: Skin color, texture, turgor normal. Right lower posterior back with a ORQUIDEA drain inplace.  Lymph nodes: Cervical, supraclavicular, and axillary nodes normal.  Neurologic: Normal strength and tone. No focal numbness or weakness. CNII-XII intact.      CBC:    Recent Labs  Lab 04/10/18  1253 04/11/18  0533   WBC 6.70 6.10   RBC 3.88* 3.49*   HGB 12.6* 11.2*   HCT 37.3* 33.8*   * 131*   MCV 96 97   MCH 32.5* 32.2*   MCHC 33.8 33.3   BMP    Recent Labs  Lab 04/10/18  1253 04/11/18  0404   * 93  93    139  139   K 3.9 4.2  4.2    108  108   CO2 26 20*  20*   BUN 21 23  23   CREATININE 1.5* 1.3  1.3   CALCIUM 8.7 8.3*  8.3*   MG  --  2.3      Diagnostic Results:  Microbiology Results (last 7 days)     ** No results found for the last 168 hours. **         Chest X-Ray:      EKG: NSR, 85, non-specific St and TW  abnormality.  Assessment/Plan:      *Elevated troponin [R74.8]  Possible diastolic CHF  Supplemental O2 via nasal canula; titrate O2 saturation to >92%.  Serial Cardiac enzymes until they peak.. Obtain 2 D ECHO  Tele-monitoring.   Discussed with Dr. SONIA Ortiz, now story is more convincing for seizure. Obtain EEG and consult Dr. Mata.  Use Nitroglycerine PRN Chest pain.  Na restriction <2g/d.      Yes    Alzheimer's dementia [G30.9]  Dementia is controlled currently. Continue home dementia meds and non-pharmacologic interventions to prevent delirium (No VS between 11PM-5AM, activity during day, opening blinds, providing glasses/hearing aids, and up in chair during daytime). Use PRN anti-psychotics to prevent behavior of self harm during sundowning, and avoid narcotics and benzos unless absolutely necessary. PRN anti-psychotics prescribed to avoid self harm behaviors.      Yes    Mild malnutrition [E44.1]  Nutrition consulted. Encourage maximal PO intake. Diet supplementation ordered per nutrition approval. Will encourage PO and monitor closely for weight changes.    MARITZA - improving  Continue IVF hydration. Follow BMP.      Yes    Hyperlipidemia [E78.5]  Chronic problem. Check lipid profile. Will continue chronic medications and monitor for any changes, adjusting as needed.      Yes    Hypertension [I10]  Hold anti-HTN medications as BP is lower.      Yes    Mass on back [R22.2]  Patient need to have ORQUIDEA drain to be removed by Dr. ANABELLA Matthews.    Yes          Discussed with patient's wife.       VTE Risk Mitigation         Ordered     enoxaparin injection 90 mg  Every 12 hours (non-standard times)     Route:  Subcutaneous        04/11/18 1013     IP VTE HIGH RISK PATIENT  Once      04/10/18 1552     Place RED hose  Until discontinued      04/10/18 1552     Place sequential compression device  Until discontinued      04/10/18 1552        Damari Partida MD  Department of Hospital Medicine   [facility]

## 2018-04-11 NOTE — PROCEDURES
ROUTINE ELECTROENCEPHALOGRAM REPORT      Blake Bradley  [id number]  11/2/1928    DATE OF SERVICE: 4/11/18    REQUESTING PROVIDER: Damari Partida MD    REASON FOR CONSULT: 88yo M with possible seizures    PRIOR EEG: none    MEDICATIONS:   Current Facility-Administered Medications   Medication    allopurinol tablet 100 mg    enoxaparin injection 90 mg    escitalopram oxalate tablet 10 mg    gemfibrozil tablet 600 mg    omega 3-dha-epa-fish oil 1,000 mg (120 mg-180 mg) Cap 1 capsule    simvastatin tablet 10 mg    sodium chloride 0.9% flush 3 mL    tamsulosin 24 hr capsule 0.4 mg    vitamin D 1000 units tablet 2,000 Units     METHODOLOGY   Electroencephalographic (EEG) recording is with electrodes placed according to the International 10-20 placement system.  Thirty two (32) channels of digital signal (sampling rate of 512/sec) including T1 and T2 was simultaneously recorded from the scalp and may include  EKG, EMG, and/or eye monitors.  Recording band pass was 0.1 to 512 hz.  Digital video recording of the patient is simultaneously recorded with the EEG.  The patient is instructed report clinical symptoms which may occur during the recording session.  EEG and video recording is stored and archived in digital format. Activation procedures which include photic stimulation, hyperventilation and instructing patients to perform simple task are done in selected patients.    The EEG is displayed on a monitor screen and can be reviewed using different montages.  Computer assisted analysis is employed to detect spike and electrographic seizure activity.   The entire record is submitted for computer analysis.  The entire recording is visually reviewed and the times identified by computer analysis as being spikes or seizures are reviewed again.  Compresses spectral analysis (CSA) is also performed on the activity recorded from each individual channel.  This is displayed as a power display of frequencies from 0 to 30 Hz over  time.   The CSA is reviewed looking for asymmetries in power between homologous areas of the scalp and then compared with the original EEG recording.     Map[employer] software was also utilized in the review of this study.  This software suite analyzes the EEG recording in multiple domains.  Coherence and rhythmicity is computed to identify EEG sections which may contain organized seizures.  Each channel undergoes analysis to detect presence of spike and sharp waves which have special and morphological characteristic of epileptic activity.  The routine EEG recording is converted from spacial into frequency domain.  This is then displayed comparing homologous areas to identify areas of significant asymmetry.  Algorithm to identify non-cortically generated artifact is used to separate eye movement, EMG and other artifact from the EEG    EEG FINGINGS  Background activity:   The background rhythm was characterized by theta-alpha (6-7 Hz) activity with a 7 Hz posterior dominant alpha rhythm at 30-70 microvolts.   Symmetry and continuity: The background was continuous and symmetric    Sleep:   Not seen.    Activation procedures:   Photic stimulation was performed with no abnormalities seen  Hyperventilation was not performed     Abnormal activity:   No epileptiform discharges, periodic discharges, lateralized rhythmic delta activity or electrographic seizures were seen.    IMPRESSION:   This is an abnormal routine EEG due to the mild generalized slowing seen, suggestive of mild diffuse or multifocal cerebral dysfunction.    No epileptiform activity or electrographic seizures seen.    CLINICAL CORRELATION IS RECOMMENDED.    Jayne Michaels MD, MARISOL(), KEEGAN SPENCE.  Neurology-Epilepsy.  [facility].

## 2018-04-11 NOTE — CONSULTS
PCP: Justino Sy MD    Cardiology Consult    Chief Complaint: Episode of diaphoresis and SOB    History of Present Illness:  Patient is a 89 y.o. male admitted to Hospitalist Service from [facility] Emergency Room with complaint of episodeseizure , diaphoresis and SOB. Patient reportedly has past medical history significant for Alzheimer's dementia, reduced hearing acquity, hypertension, hyperlipidemia, nephrolithiasis, PVD and history of seizure. Part of the history obtained from wife who is at the bedside. Patient presented via EMS with nasal cannula in place with an onset of SOB. Per EMS, 911 was activated for seizure-like activity;wife reports that the patient stated that he felt bad and went to bed. He then had a generalized seizure with tonic / clonic activity. The episode lasted about 3 mins.He was incontinent of urine and bowels. He was gurgling and had difficulty breathing with noisy respiration. However, EMS states that he was diaphoretic with labored breathing but not postictal. The patient had an oxygen saturation in the high 90's and a CBG of 199 with an EKG showing some depressions. Currently, the SOB has resolved. He had a recent procedure to remove a mass on his back performed by Dr. Roberto Matthews 2-3 weeks ago on 3/22/18. Patient denied abdominal pain, nausea, vomiting, headache, vision changes, focal neuro-deficits, cough or fever.    Past Medical History:   Diagnosis Date    Alzheimer's dementia     Depression     Disorder of kidney and ureter     renal stones and cysts    Gout     Hyperlipidemia     Hypertension     Kidney stones     Macular degeneration     Psoriasis     PVD (peripheral vascular disease)     Seizures          Lipoma back.    Past Surgical History:   Procedure Laterality Date    back mass excision Right 03/22/2018    EYE SURGERY Right     cataract lazer    Laft knee      50 years ago; repair     Family History   Problem Relation Age of Onset     Cancer Mother      breast    Brain Hemorrhage Father     Heart disease Brother      had heart transplant later  of heart problems    No Known Problems Daughter     No Known Problems Son      Social History   Substance Use Topics    Smoking status: Former Smoker     Quit date:     Smokeless tobacco: Never Used      Comment: quit 65 years ago    Alcohol use No      Review of patient's allergies indicates:   Allergen Reactions    Augmentin [amoxicillin-pot clavulanate]     Clindamycin     Iodine and iodide containing products     Pcn [penicillins]         Shrimp allergy.    PTA Medications   Medication Sig    allopurinol (ZYLOPRIM) 100 MG tablet Take 100 mg by mouth 2 (two) times daily.     cholecalciferol, vitamin D3, 2,000 unit Tab Take 2,000 Units by mouth once daily.     donepezil (ARICEPT) 10 MG tablet Take 10 mg by mouth every evening.     escitalopram oxalate (LEXAPRO) 10 MG tablet TAKE ONE TABLET BY MOUTH ONCE DAILY    fish oil-omega-3 fatty acids 300-1,000 mg capsule Take 1 capsule by mouth once daily.    gemfibrozil (LOPID) 600 MG tablet Take 600 mg by mouth every evening.     losartan-hydrochlorothiazide 50-12.5 mg (HYZAAR) 50-12.5 mg per tablet TAKE ONE TABLET BY MOUTH ONCE DAILY    simvastatin (ZOCOR) 10 MG tablet Take 10 mg by mouth every evening.     tamsulosin (FLOMAX) 0.4 mg Cp24 TAKE ONE CAPSULE BY MOUTH ONCE DAILY     Review of Systems:  Constitutional: no fever or chills  Eyes: no visual changes; poor vision; near blind  Ears, nose, mouth, throat, and face: no nasal congestion or sore throat; [place]  Respiratory: see HPI  Cardiovascular: see HPI; ambulates with cane or walker; house bound; sob at night x 2 weeks.  Gastrointestinal: no nausea or vomiting, no abdominal pain or change in bowel habits  Genitourinary: no hematuria or dysuria  Integument/breast: no rash or pruritis  Hematologic/lymphatic: no easy bruising or lymphadenopathy  Musculoskeletal: no arthralgias or  myalgias  Neurological: no seizures or tremors.  Behavioral/Psych: no auditory or visual hallucinations  Endocrine: no heat or cold intolerance     OBJECTIVE:     Vital Signs (Most Recent)  Temp: 98.9 °F (37.2 °C) (04/10/18 1945)  Pulse: 79 (04/10/18 1945)  Resp: 18 (04/10/18 1945)  BP: (!) 96/51 (04/10/18 1945)  SpO2: 96 % (04/10/18 1945)    Physical Exam:  General appearance: well developed, appears stated age  Head: normocephalic, atraumatic  Eyes:  conjunctivae/corneas clear. PERRL.  Nose: Nares normal. Septum midline.  Throat: lips, mucosa, and tongue normal; teeth and gums normal, no throat erythema.  Neck: supple, symmetrical, trachea midline, no JVD and thyroid not enlarged, symmetric, no tenderness/mass/nodules  Lungs:  clear to auscultation bilaterally and normal respiratory effort  Chest wall: no tenderness  Heart: regular rate and rhythm, S1, S2 normal, no murmur, click, rub or gallop  Abdomen: soft, non-tender non-distented; bowel sounds normal; no masses,  no organomegaly  Extremities: no cyanosis, clubbing or edema.   Pulses: 2+ and symmetric  Skin: Skin color, texture, turgor normal. Right lower posterior back with a ORQUIDEA drain inplace.  Lymph nodes: Cervical, supraclavicular, and axillary nodes normal.  Neurologic: Normal strength and tone. No focal numbness or weakness. CNII-XII intact.      Laboratory:   CBC:     Recent Labs  Lab 04/10/18  1253   WBC 6.70   RBC 3.88*   HGB 12.6*   HCT 37.3*   *   MCV 96   MCH 32.5*   MCHC 33.8   BNP: 553    CMP:     Recent Labs  Lab 04/10/18  1253   *   CALCIUM 8.7   ALBUMIN 2.7*   PROT 5.6*      K 3.9   CO2 26      BUN 21   CREATININE 1.5*   ALKPHOS 65   ALT 10   AST 13   BILITOT 0.5   Cardiac markers:     Recent Labs  Lab 04/10/18  1808   CPKMB 9.1*   TROPONINI 1.263*  1.263*    CPK 99.      Diagnostic Results:  Chest X-Ray: Hazy left base.    EKG: NSR, 85, non-specific St and TW abnormality.    Assessment/Plan:     Active  Hospital Problems    Diagnosis  POA    *Elevated troponin [R74.8]  Possible diastolic CHF  Supplemental O2 via nasal canula; titrate O2 saturation to >92%.  Serial Cardiac enzymes × 3. Obtain 2 D ECHO  Tele-monitoring.   Check fasting lipid panel and EKG in AM.  Use Nitroglycerine PRN Chest pain.  Na restriction <2g/d.    Yes    Alzheimer's dementia [G30.9]  Dementia is controlled currently. Continue home dementia meds and non-pharmacologic interventions to prevent delirium (No VS between 11PM-5AM, activity during day, opening blinds, providing glasses/hearing aids, and up in chair during daytime). Use PRN anti-psychotics to prevent behavior of self harm during sundowning, and avoid narcotics and benzos unless absolutely necessary. PRN anti-psychotics prescribed to avoid self harm behaviors.    Yes    Mild malnutrition [E44.1]  Nutrition consulted. Encourage maximal PO intake. Diet supplementation ordered per nutrition approval. Will encourage PO and monitor closely for weight changes.    Yes    Hyperlipidemia [E78.5]  Chronic problem. Check lipid profile. Will continue chronic medications and monitor for any changes, adjusting as needed.    Yes    Hypertension [I10]  Hold anti-HTN medications as BP is lower.    Yes    Mass on back [R22.2]  Patient need to have ORQUIDEA drain to be removed by Dr. ANABELLA Matthews.   Yes            Seizure.    Discussed with patient's wife. Angiogram, possible PCI; not a good CABG candidate. Risk of RYAN. Trop elevation could in part be due to decreased clearance. Total CPK is normal.  Cause of seizure unclear; ?? Cardiac arrhythmia. However, tonic / clonic activity for   2 - 3 mins unlikely.  VTE Risk Mitigation         Ordered     enoxaparin injection 40 mg  Every 24 hours (non-standard times)     Route:  Subcutaneous        04/10/18 1755     IP VTE HIGH RISK PATIENT  Once      04/10/18 1552     Place RED hose  Until discontinued      04/10/18 1552     Place sequential compression device   Until discontinued      04/10/18 1552        William Ortiz MD  Department of Cardiology   [facility]

## 2018-04-11 NOTE — PLAN OF CARE
Problem: Patient Care Overview  Goal: Plan of Care Review  Outcome: Ongoing (interventions implemented as appropriate)  Plan of care reviewed with patient and spouse, verbalized understanding. Evening medications tolerated well. BP runs on the lower side, have been monitoring all shift. NAD noted. Pt NPO since midnight. Fall precautions maintained. Will continue to monitor.

## 2018-04-11 NOTE — PLAN OF CARE
I completed the assessment with the pt and his wife at bedside, Ashley [phone]. The pt lives with his spouse, son and DIL. He stated that he has a cane and wheelchair and denies HH. He uses [facility] pharmacy. Verified PCP as Dr. Sy and insurance as [employer]. I educated the pt and his wife on the blue discharge folder and wrote my name and number on the pts white board. Nieves Espino LMSW     04/11/18 1209   Discharge Assessment   Assessment Type Discharge Planning Assessment   Confirmed/corrected address and phone number on facesheet? Yes   Assessment information obtained from? Patient;Caregiver   Communicated expected length of stay with patient/caregiver no   Prior to hospitilization cognitive status: Alert/Oriented   Prior to hospitalization functional status: Assistive Equipment   Current cognitive status: Alert/Oriented   Current Functional Status: Assistive Equipment   Lives With spouse;child(belgica), adult   Able to Return to Prior Arrangements yes   Is patient able to care for self after discharge? Yes   Who are your caregiver(s) and their phone number(s)? Spouse Ashley [phone] and son Angelito [phone]   Readmission Within The Last 30 Days no previous admission in last 30 days   Patient currently being followed by outpatient case management? No   Patient currently receives any other outside agency services? No   Equipment Currently Used at Home cane, straight;walker, standard   Do you have any problems affording any of your prescribed medications? No   Is the patient taking medications as prescribed? yes   Does the patient have transportation home? Yes   Transportation Available family or friend will provide   Does the patient receive services at the [facility]? No   Discharge Plan A Home   Discharge Plan B Home with family   Patient/Family In Agreement With Plan yes

## 2018-04-12 ENCOUNTER — TELEPHONE (OUTPATIENT)
Dept: SURGERY | Facility: CLINIC | Age: 83
End: 2018-04-12

## 2018-04-12 VITALS
HEIGHT: 70 IN | SYSTOLIC BLOOD PRESSURE: 106 MMHG | BODY MASS INDEX: 28.63 KG/M2 | OXYGEN SATURATION: 95 % | WEIGHT: 200 LBS | RESPIRATION RATE: 18 BRPM | DIASTOLIC BLOOD PRESSURE: 58 MMHG | HEART RATE: 75 BPM | TEMPERATURE: 98 F

## 2018-04-12 LAB
ANION GAP SERPL CALC-SCNC: 10 MMOL/L
ANION GAP SERPL CALC-SCNC: 10 MMOL/L
BASOPHILS # BLD AUTO: 0 K/UL
BASOPHILS # BLD AUTO: 0 K/UL
BASOPHILS NFR BLD: 0.3 %
BASOPHILS NFR BLD: 0.3 %
BUN SERPL-MCNC: 21 MG/DL
BUN SERPL-MCNC: 21 MG/DL
CALCIUM SERPL-MCNC: 8.8 MG/DL
CALCIUM SERPL-MCNC: 8.8 MG/DL
CHLORIDE SERPL-SCNC: 107 MMOL/L
CHLORIDE SERPL-SCNC: 107 MMOL/L
CO2 SERPL-SCNC: 24 MMOL/L
CO2 SERPL-SCNC: 24 MMOL/L
CREAT SERPL-MCNC: 1.3 MG/DL
CREAT SERPL-MCNC: 1.3 MG/DL
DIFFERENTIAL METHOD: ABNORMAL
DIFFERENTIAL METHOD: ABNORMAL
EOSINOPHIL # BLD AUTO: 0.3 K/UL
EOSINOPHIL # BLD AUTO: 0.3 K/UL
EOSINOPHIL NFR BLD: 6.6 %
EOSINOPHIL NFR BLD: 6.6 %
ERYTHROCYTE [DISTWIDTH] IN BLOOD BY AUTOMATED COUNT: 14.5 %
ERYTHROCYTE [DISTWIDTH] IN BLOOD BY AUTOMATED COUNT: 14.5 %
EST. GFR  (AFRICAN AMERICAN): 56 ML/MIN/1.73 M^2
EST. GFR  (AFRICAN AMERICAN): 56 ML/MIN/1.73 M^2
EST. GFR  (NON AFRICAN AMERICAN): 48 ML/MIN/1.73 M^2
EST. GFR  (NON AFRICAN AMERICAN): 48 ML/MIN/1.73 M^2
GLUCOSE SERPL-MCNC: 115 MG/DL
GLUCOSE SERPL-MCNC: 115 MG/DL
HCT VFR BLD AUTO: 35.9 %
HCT VFR BLD AUTO: 35.9 %
HGB BLD-MCNC: 12.1 G/DL
HGB BLD-MCNC: 12.1 G/DL
LYMPHOCYTES # BLD AUTO: 0.5 K/UL
LYMPHOCYTES # BLD AUTO: 0.5 K/UL
LYMPHOCYTES NFR BLD: 12.2 %
LYMPHOCYTES NFR BLD: 12.2 %
MAGNESIUM SERPL-MCNC: 1.9 MG/DL
MCH RBC QN AUTO: 32.3 PG
MCH RBC QN AUTO: 32.3 PG
MCHC RBC AUTO-ENTMCNC: 33.7 G/DL
MCHC RBC AUTO-ENTMCNC: 33.7 G/DL
MCV RBC AUTO: 96 FL
MCV RBC AUTO: 96 FL
MONOCYTES # BLD AUTO: 0.4 K/UL
MONOCYTES # BLD AUTO: 0.4 K/UL
MONOCYTES NFR BLD: 8.8 %
MONOCYTES NFR BLD: 8.8 %
NEUTROPHILS # BLD AUTO: 3.2 K/UL
NEUTROPHILS # BLD AUTO: 3.2 K/UL
NEUTROPHILS NFR BLD: 72.1 %
NEUTROPHILS NFR BLD: 72.1 %
PLATELET # BLD AUTO: 142 K/UL
PLATELET # BLD AUTO: 142 K/UL
PMV BLD AUTO: 8.8 FL
PMV BLD AUTO: 8.8 FL
POTASSIUM SERPL-SCNC: 3.8 MMOL/L
POTASSIUM SERPL-SCNC: 3.8 MMOL/L
RBC # BLD AUTO: 3.74 M/UL
RBC # BLD AUTO: 3.74 M/UL
SODIUM SERPL-SCNC: 141 MMOL/L
SODIUM SERPL-SCNC: 141 MMOL/L
WBC # BLD AUTO: 4.5 K/UL
WBC # BLD AUTO: 4.5 K/UL

## 2018-04-12 PROCEDURE — 36415 COLL VENOUS BLD VENIPUNCTURE: CPT

## 2018-04-12 PROCEDURE — 85025 COMPLETE CBC W/AUTO DIFF WBC: CPT

## 2018-04-12 PROCEDURE — 83735 ASSAY OF MAGNESIUM: CPT

## 2018-04-12 PROCEDURE — G0378 HOSPITAL OBSERVATION PER HR: HCPCS

## 2018-04-12 PROCEDURE — 25000003 PHARM REV CODE 250

## 2018-04-12 PROCEDURE — 63600175 PHARM REV CODE 636 W HCPCS: Performed by: INTERNAL MEDICINE

## 2018-04-12 PROCEDURE — 95819 EEG AWAKE AND ASLEEP: CPT

## 2018-04-12 PROCEDURE — 80048 BASIC METABOLIC PNL TOTAL CA: CPT

## 2018-04-12 PROCEDURE — 94761 N-INVAS EAR/PLS OXIMETRY MLT: CPT

## 2018-04-12 PROCEDURE — 25000003 PHARM REV CODE 250: Performed by: SPECIALIST

## 2018-04-12 PROCEDURE — 99215 OFFICE O/P EST HI 40 MIN: CPT | Mod: ,,, | Performed by: PSYCHIATRY & NEUROLOGY

## 2018-04-12 PROCEDURE — 25000003 PHARM REV CODE 250: Performed by: INTERNAL MEDICINE

## 2018-04-12 PROCEDURE — 99217 PR OBSERVATION CARE DISCHARGE: CPT | Mod: ,,, | Performed by: INTERNAL MEDICINE

## 2018-04-12 RX ORDER — ENOXAPARIN SODIUM 100 MG/ML
40 INJECTION SUBCUTANEOUS
Status: DISCONTINUED | OUTPATIENT
Start: 2018-04-13 | End: 2018-04-12 | Stop reason: HOSPADM

## 2018-04-12 RX ORDER — ASPIRIN 81 MG/1
81 TABLET ORAL DAILY
Refills: 0 | COMMUNITY
Start: 2018-04-13 | End: 2019-04-13

## 2018-04-12 RX ORDER — CARVEDILOL 3.12 MG/1
3.12 TABLET ORAL 2 TIMES DAILY WITH MEALS
Status: DISCONTINUED | OUTPATIENT
Start: 2018-04-12 | End: 2018-04-12

## 2018-04-12 RX ORDER — CARVEDILOL 3.12 MG/1
TABLET ORAL
Status: COMPLETED
Start: 2018-04-12 | End: 2018-04-12

## 2018-04-12 RX ORDER — ASPIRIN 325 MG
325 TABLET ORAL ONCE
Status: COMPLETED | OUTPATIENT
Start: 2018-04-12 | End: 2018-04-12

## 2018-04-12 RX ORDER — CARVEDILOL 3.12 MG/1
3.12 TABLET ORAL 2 TIMES DAILY WITH MEALS
Status: DISCONTINUED | OUTPATIENT
Start: 2018-04-12 | End: 2018-04-12 | Stop reason: HOSPADM

## 2018-04-12 RX ORDER — ASPIRIN 81 MG/1
81 TABLET ORAL DAILY
Status: DISCONTINUED | OUTPATIENT
Start: 2018-04-13 | End: 2018-04-12 | Stop reason: HOSPADM

## 2018-04-12 RX ORDER — LISINOPRIL 2.5 MG/1
2.5 TABLET ORAL DAILY
Status: DISCONTINUED | OUTPATIENT
Start: 2018-04-12 | End: 2018-04-12 | Stop reason: HOSPADM

## 2018-04-12 RX ORDER — CARVEDILOL 3.12 MG/1
3.12 TABLET ORAL 2 TIMES DAILY WITH MEALS
Qty: 60 TABLET | Refills: 0 | Status: SHIPPED | OUTPATIENT
Start: 2018-04-12 | End: 2019-04-12

## 2018-04-12 RX ORDER — LISINOPRIL 2.5 MG/1
2.5 TABLET ORAL DAILY
Qty: 30 TABLET | Refills: 0 | Status: SHIPPED | OUTPATIENT
Start: 2018-04-12 | End: 2019-04-12

## 2018-04-12 RX ADMIN — ESCITALOPRAM 10 MG: 10 TABLET, FILM COATED ORAL at 09:04

## 2018-04-12 RX ADMIN — CARVEDILOL 3.12 MG: 3.12 TABLET, FILM COATED ORAL at 03:04

## 2018-04-12 RX ADMIN — ENOXAPARIN SODIUM 90 MG: 100 INJECTION SUBCUTANEOUS at 11:04

## 2018-04-12 RX ADMIN — LISINOPRIL 2.5 MG: 2.5 TABLET ORAL at 03:04

## 2018-04-12 RX ADMIN — ALLOPURINOL 100 MG: 100 TABLET ORAL at 09:04

## 2018-04-12 RX ADMIN — ASPIRIN 325 MG ORAL TABLET 325 MG: 325 PILL ORAL at 03:04

## 2018-04-12 RX ADMIN — OMEGA-3 FATTY ACIDS CAP 1000 MG 1 CAPSULE: 1000 CAP at 09:04

## 2018-04-12 RX ADMIN — CARVEDILOL 3.12 MG: 3.12 TABLET ORAL at 03:04

## 2018-04-12 RX ADMIN — VITAMIN D, TAB 1000IU (100/BT) 2000 UNITS: 25 TAB at 09:04

## 2018-04-12 RX ADMIN — TAMSULOSIN HYDROCHLORIDE 0.4 MG: 0.4 CAPSULE ORAL at 09:04

## 2018-04-12 NOTE — PROGRESS NOTES
Pharmacist Renal Dose Adjustment Note    Blake Bradley is a 89 y.o. male being treated with the medication enoxaparin     Patient Data:    Vital Signs (Most Recent):  Temp: 96 °F (35.6 °C) (04/12/18 1057)  Pulse: 76 (04/12/18 1057)  Resp: 17 (04/12/18 1057)  BP: (!) 112/57 (04/12/18 1057)  SpO2: 97 % (04/12/18 1057)   Vital Signs (72h Range):  Temp:  [96 °F (35.6 °C)-98.9 °F (37.2 °C)]   Pulse:  [71-90]   Resp:  [16-20]   BP: ()/(51-68)   SpO2:  [92 %-98 %]        Recent Labs     Lab 04/10/18  1253 04/11/18  0404 04/12/18  0444   CREATININE 1.5* 1.3  1.3 1.3  1.3     Serum creatinine: 1.3 mg/dL 04/12/18 0444  Estimated creatinine clearance: 43.6 mL/min    Medication: enoxaparin  dose: 30mg frequency daily will be changed to medication: dose: 40mg frequency: daily    Pharmacist's Name: Cristofer Stratton  Pharmacist's Extension: 1976

## 2018-04-12 NOTE — TELEPHONE ENCOUNTER
estelita march with [facility] call she advised that mr jacquie had removed his drains during and episode of confusion advised nurse that I would inform dr arita

## 2018-04-12 NOTE — CONSULTS
Date of service:  2018     Chief complaint:  Seizure    Interval history:  No acute events overnight.  No episodes worrisome for seizures.    History of present illness:  The patient is a 89 y.o. male referred for evaluation of episodes suspicious for seizure. This occurred on the day of admission. There was no aura.  His seizure was characterized by loss of consciousness with generalized stiffening and convulsions.  This component of this spell lasted for approximately 2 minutes.  Afterwards, he was fatigued.  The patient has had no similar events previously.      Past Medical History:   Diagnosis Date    Alzheimer's dementia     Depression     Disorder of kidney and ureter     renal stones and cysts    Gout     Hyperlipidemia     Hypertension     Kidney stones     Macular degeneration     Psoriasis     PVD (peripheral vascular disease)     Seizures        Past Surgical History:   Procedure Laterality Date    back mass excision Right 2018    EYE SURGERY Right     cataract lazer    Laft knee      50 years ago; repair       Family History   Problem Relation Age of Onset    Cancer Mother      breast    Brain Hemorrhage Father     Heart disease Brother      had heart transplant later  of heart problems    No Known Problems Daughter     No Known Problems Son        Social History     Social History    Marital status:      Spouse name: N/A    Number of children: N/A    Years of education: N/A     Occupational History    Not on file.     Social History Main Topics    Smoking status: Former Smoker     Quit date:     Smokeless tobacco: Never Used      Comment: quit 65 years ago    Alcohol use No    Drug use: No    Sexual activity: Not on file     Other Topics Concern    Not on file     Social History Narrative    No narrative on file        Review of patient's allergies indicates:   Allergen Reactions    Augmentin [amoxicillin-pot clavulanate]     Clindamycin      "Iodine and iodide containing products     Pcn [penicillins]         Review of Systems  A comprehensive review of systems was performed as a part of the patient's admission H&P.  It is noteworthy as above.  It is otherwise noncontributory.  Please see the patient's chart for further details.      Physical exam:  /60 (Patient Position: Lying)   Pulse 81   Temp 96.5 °F (35.8 °C) (Oral)   Resp 18   Ht 5' 10" (1.778 m)   Wt 90.7 kg (200 lb)   SpO2 95%   BMI 28.70 kg/m²   General: Well developed, well nourished.  No acute distress.  HEENT: Atraumatic, normocephalic.  Neck: Supple, trachea midline.  Cardiovascular: Regular rate and rhythm.  Pulmonary: No increased work of breathing.  Abdomen/GI: No guarding.  Musculoskeletal: No obvious joint deformities, moves all extremities well.    Neurological exam:  Mental status: Awake and alert.  Oriented x3.  Speech fluent and appropriate.  Recent and remote memory appear to be limited.  Fund of knowledge limited.  Cranial nerves: Pupils equal round and reactive to light, extraocular movements intact, facial strength and sensation intact bilaterally, palate and tongue midline, hearing impaired bilaterally.  Motor: 5 out of 5 strength throughout the upper and lower extremities bilaterally. Normal bulk and tone.  Sensation: Intact to light touch and temperature bilaterally.  DTR: 1+ at the knees and biceps bilaterally.  Coordination: Finger-nose-finger testing intact bilaterally.  Gait: Nonfocal gait.    Data base:  Chart reviewed.    Labs:  BMP: includes cr=1.3  CBC: includes HCT=34 and rla=664    MRI brain:  "1. No acute intracranial abnormality appreciated.  2. Age-appropriate cerebral volume loss and chronic microvascular ischemic changes within the periventricular white matter.  3. Remote foci of lacunar infarction within the left and right corona radiata and centrum semiovale.  4. Incidentally observed prominent perivascular spaces within the basal ganglia"  I " "independently visualized and interpreted this study.     EEG:  "This is an abnormal routine EEG due to the mild generalized slowing seen, suggestive of mild diffuse or multifocal cerebral dysfunction.  No epileptiform activity or electrographic seizures seen."    EKG:  "Normal sinus rhythm  ST and T wave abnormality, consider lateral ischemia  Abnormal ECG  No previous ECGs available"    Assessment and plan:  The patient is a 89 y.o. male who presents following a single event worrisome for seizure.  As far as medications go, we will hold off on starting AEDs at this time.  Should the patient have a second seizure with no obvious provoking factors, I would advocate for starting anticonvulsant medication at that time.  The patient should not drive until seizure free for 6 months, though his cognitive issues likely preclude him from driving in any case.  Seizure safety has also been discussed.  "

## 2018-04-12 NOTE — TELEPHONE ENCOUNTER
----- Message from Tea Crystal sent at 4/12/2018  7:57 AM CDT -----  Contact: [place] - Nurse [facility]  Patient removed J P  Drain in back last night contact [place] at [facility] at [phone]    Thank you

## 2018-04-12 NOTE — PLAN OF CARE
Problem: Patient Care Overview  Goal: Plan of Care Review  Outcome: Ongoing (interventions implemented as appropriate)  POC reviewed with Pt and wife at bedside concerning Pt SOB and abnormal bloodwork, new imaging and bloodwork ordered and what to expect. Safety maintained throughout shift. Bed kept low and locked, SR's up x 2, call light within easy reach.

## 2018-04-12 NOTE — PROGRESS NOTES
POST-OP NOTE    PROCEDURE: Excision of back mass.    COMPLAINTS: None.    EXAM: Incision well approximated. No drainage. No infection.      IMPRESSION: Doing well.    FINAL PATHOLOGIC DIAGNOSIS  Lower back cutaneous mass:  Chronically inflamed fibrous walled cyst containing blood without definitive lining cells.  No evidence of atypia or malignancy.  Correlate clinically.    PLAN: FU as needed.

## 2018-04-12 NOTE — PLAN OF CARE
Problem: Fall Risk (Adult)  Intervention: Monitor/Assist with Self Care  Pt experienced severe confusion throughout the night.  Removed ORQUIDEA drain, iv, grown and covers from bed.  Pt moved closer to nurse's station.  Wife in room with pt asleep when episode of confusion occurred.  Hourly visual checks in place, pt remains calm at this time.

## 2018-04-12 NOTE — DISCHARGE SUMMARY
Discharge Summary  Hospital Medicine    Admit Date: 4/10/2018    Date and Time: 4/12/20183:09 PM    Discharge Attending Physician: Damari Partida MD    Primary Care Physician: Justino Sy MD    Diagnoses:  Active Hospital Problems    Diagnosis  POA    *Elevated troponin [R74.8]  Yes    Alzheimer's dementia [G30.9]  Yes    Mild malnutrition [E44.1]  Yes    Hyperlipidemia [E78.5]  Yes    Hypertension [I10]  Yes    Mass on back [R22.2]  Yes      Resolved Hospital Problems    Diagnosis Date Resolved POA   No resolved problems to display.     Discharged Condition: Good    Hospital Course:   Patient is a 89 y.o. male admitted to Hospitalist Service from [facility] Emergency Room with complaint of episode of diaphoresis and SOB. Patient reportedly has past medical history significant for Alzheimer's dementia, reduced hearing acquity, hypertension, hyperlipidemia, nephrolithiasis, PVD and history of seizure. Part of the history obtained from wife who is at the bedside. Patient presented via EMS with nasal cannula in place with an onset of SOB. Per EMS, 911 was activated for seizure-like activity; however, EMS states that he was diaphoretic with labored breathing but not postictal. The patient had an oxygen saturation in the high 90's and a CBG of 199 with an EKG showing some depressions. Currently, the SOB has resolved. He had a recent procedure to remove a mass on his back performed by Dr. Roberto Matthews 2-3 weeks ago on 3/22/18. Patient denied abdominal pain, nausea, vomiting, headache, vision changes, focal neuro-deficits, cough or fever. Patient was admitted to Hospitalist medicine service. Patient was evaluated by Dr. SONIA Ortiz and Dr. Mata. Patient's serial cardiac cardiac enzymes were noted to be elevated but Dr. Ortiz not convinced for acute NSTEMI. ECHO showed lower EF. Patient started on new cardiac medications. Patient underwent EEG whch did not show epileptiform activity. Dr. Mata did not  recommend any anti-epileptics since this was first episode. No driving advised. Symptoms improved. Patient was discharged home in stable condition with following discharge plan of care.     Consults: Dr. SONIA Ortiz, Dr. aMta    Significant Diagnostic Studies:   MRI brain:  1. No acute intracranial abnormality appreciated.  2. Age-appropriate cerebral volume loss and chronic microvascular ischemic changes within the periventricular white matter.  3. Remote foci of lacunar infarction within the left and right corona radiata and centrum semiovale.  4. Incidentally observed prominent perivascular spaces within the basal ganglia.    EEG:  This is an abnormal routine EEG due to the mild generalized   slowing seen, suggestive of mild diffuse or multifocal cerebral   Dysfunction. No epileptiform activity or electrographic seizures seen.        ECHO:  · Left ventricle ejection fraction is moderately decreased. 30-39%  · Left ventricular diastolic filling is abnormal consistent with impaired relaxation.  · Left atrium is mildly dilated.  · Mitral valve shows mild regurgitation.  · Tricuspid valve shows trace regurgitation.  · LVend systolic dimension is at upper limit to mildly increased  · Global LV hypokinesia.  Microbiology Results (last 7 days)     ** No results found for the last 168 hours. **        Special Treatments/Procedures: None  Disposition: Home or Self Care    Medications:  Reconciled Home Medications: Current Discharge Medication List      START taking these medications    Details   aspirin (ECOTRIN) 81 MG EC tablet Take 1 tablet (81 mg total) by mouth once daily.  Refills: 0      carvedilol (COREG) 3.125 MG tablet Take 1 tablet (3.125 mg total) by mouth 2 (two) times daily with meals.  Qty: 60 tablet, Refills: 0      lisinopril (PRINIVIL,ZESTRIL) 2.5 MG tablet Take 1 tablet (2.5 mg total) by mouth once daily.  Qty: 30 tablet, Refills: 0         CONTINUE these medications which have NOT CHANGED    Details    allopurinol (ZYLOPRIM) 100 MG tablet Take 100 mg by mouth 2 (two) times daily.       cholecalciferol, vitamin D3, 2,000 unit Tab Take 2,000 Units by mouth once daily.       donepezil (ARICEPT) 10 MG tablet Take 10 mg by mouth every evening.       escitalopram oxalate (LEXAPRO) 10 MG tablet TAKE ONE TABLET BY MOUTH ONCE DAILY      fish oil-omega-3 fatty acids 300-1,000 mg capsule Take 1 capsule by mouth once daily.      gemfibrozil (LOPID) 600 MG tablet Take 600 mg by mouth every evening.       simvastatin (ZOCOR) 10 MG tablet Take 10 mg by mouth every evening.       tamsulosin (FLOMAX) 0.4 mg Cp24 TAKE ONE CAPSULE BY MOUTH ONCE DAILY         STOP taking these medications       losartan-hydrochlorothiazide 50-12.5 mg (HYZAAR) 50-12.5 mg per tablet Comments:   Reason for Stopping:               Discharge Procedure Orders  Referral to Home health   Referral Priority: Routine Referral Type: Home Health   Referral Reason: Specialty Services Required    Requested Specialty: Home Health Services    Number of Visits Requested: 1      Diet Cardiac     Activity as tolerated   Order Comments: Observe fall precautions.     Call MD for:   Order Comments: For worsening symptoms, chest pain, shortness of breath, increased abdominal pain, high grade fever, stroke or stroke like symptoms, immediately go to the nearest Emergency Room or call 911 as soon as possible.       Follow-up Information     William Ortiz MD On 4/19/2018.    Specialty:  Cardiology  Contact information:  [address] [facility]  [address]  [facility] [address]  [phone]             Justino Sy MD In 1 week.    Specialty:  Family Medicine  Contact information:  [address] [facility]  [place] [address]  [phone]

## 2018-04-12 NOTE — PLAN OF CARE
Spoke with Dr Ortiz regarding the pt's discharge; he wants the Lisinopril and Coreg given now then the pt's blood pressure checked at 6pm. If the pt's blood pressure is stable, he can discharge and FU with him next Thursday, 4/19/18. I informed Dr Partida of the above plan....BLAKE Wang       04/12/18 1764   Discharge Reassessment   Assessment Type Discharge Planning Reassessment

## 2018-04-12 NOTE — PLAN OF CARE
I met with the pts son at bedside and provided him with a resource guide for senior services including home health agencies, adult 's and sitter services. I also told him that BLAKE Chiu CM is going to send a referral to [facility] for outpatient case management services. The pts son thanked me and I left the room. Nieves Espino LMSW      04/12/18 [id number]   Discharge Reassessment   Assessment Type Discharge Planning Reassessment

## 2018-04-12 NOTE — NURSING
Discharge instructions went over with pt. Pt verbalizes understanding and has no new questions at this time. VSS. IV removed, catheter intact. Telemetry monitor removed. AVS printed and given to pt along with printed prescription. Pt left via wheelchiar with staff

## 2018-04-12 NOTE — TELEPHONE ENCOUNTER
----- Message from Meredith Weiss sent at 4/12/2018 11:19 AM CDT -----  Contact: [facility] to speak to the Nurse. Call to pod. No answer. Pulled out ORQUIDEA vein. Room 222  Call back    Thanks!

## 2018-04-12 NOTE — PROGRESS NOTES
Progress Note  Cardiology    Admit Date: 4/10/2018   LOS: 1 day     Follow-up For:  Seizures. COM.    Scheduled Meds:   allopurinol  100 mg Oral BID    enoxaparin  1 mg/kg Subcutaneous Q12H    escitalopram oxalate  10 mg Oral Daily    gemfibrozil  600 mg Oral QHS    omega 3-dha-epa-fish oil  1 capsule Oral Daily    simvastatin  10 mg Oral QHS    sodium chloride 0.9%  3 mL Intravenous Q8H    tamsulosin  1 capsule Oral Daily with breakfast    vitamin D  2,000 Units Oral Daily     Continuous Infusions:  PRN Meds:ramelteon    Review of patient's allergies indicates:   Allergen Reactions    Augmentin [amoxicillin-pot clavulanate]     Clindamycin     Iodine and iodide containing products     Pcn [penicillins]        SUBJECTIVE:     Interval History: Patient has no complaint of cp or sob.    Review of Systems  Respiratory: negative for cough, hemoptysis, sputum and wheezing  Cardiovascular: negative for chest pressure/discomfort, dyspnea, orthopnea, palpitations and paroxysmal nocturnal dyspnea    OBJECTIVE:     Vital Signs (Most Recent)  Temp: 96 °F (35.6 °C) (04/12/18 1057)  Pulse: 76 (04/12/18 1057)  Resp: 17 (04/12/18 1057)  BP: (!) 112/57 (04/12/18 1057)  SpO2: 97 % (04/12/18 1057)    Vital Signs Range (Last 24H):  Temp:  [96 °F (35.6 °C)-98.2 °F (36.8 °C)]   Pulse:  [76-88]   Resp:  [17-20]   BP: ()/(57-65)   SpO2:  [92 %-97 %]       Physical Exam:  Neck: no carotid bruit, no JVD and supple, symmetrical, trachea midline  Lungs: rales RLL  Heart: regular rate and rhythm, S1, S2 normal, no murmur, click, rub or gallop  Abdomen: soft, non-tender; bowel sounds normal; no masses,  no organomegaly  Extremities: Extremities normal, atraumatic, no cyanosis, clubbing, or edema    Recent Results (from the past 24 hour(s))   CK-MB    Collection Time: 04/11/18 12:36 PM   Result Value Ref Range    CPK 90 20 - 200 U/L    CPK MB 3.7 0.1 - 6.5 ng/mL    MB% 4.1 0.0 - 5.0 %   Troponin I    Collection Time: 04/11/18  12:36 PM   Result Value Ref Range    Troponin I 0.966 (H) 0.000 - 0.026 ng/mL   Transthoracic echo (TTE) complete    Collection Time: 04/11/18 12:55 PM   Result Value Ref Range    AV mean gradient 1.94 mmHg    Ao peak spenser 0.89 m/s    Ao VTI 0.17 cm    IVRT 0.12 msec    IVS 1.00 0.6 - 1.1 cm    LA size 4.41 cm    LVIDD 4.76 3.5 - 6.0 cm    LVIDS 3.97 2.1 - 4.0 cm    LVOT diameter 2.01 cm    LVOT peak VTI 0.17 cm    PW 0.72 0.6 - 1.1 cm    MV Peak A Sepnser 0.91 m/s    E wave decelartion time 208.58 msec    MV Peak E Spenser 0.60 m/s    RVDD 2.98 cm    TR Max Spenser 2.24 m/s    Ao root annulus 3.12 cm    AORTIC VALVE CUSP SEPERATION 1.87 cm    PV PEAK VELOCITY 0.75 cm/s    PV peak gradient 2.26 mmHg    Triscuspid Valve Regurgitation Peak Gradient 20.04 mmHg    MV stenosis pressure 1/2 time 60.49 ms    FS 17 %    Left Ventricle Relative Wall Thickness 0.36 cm    AV valve area 3.17 cm2    MV valve area p 1/2 method 3.64 cm2    E/A ratio 0.66     LVOT area 3.17 cm2    LVOT stroke volume 0.54 cm3    BSA 2.12 m2    TDI SEPTAL 0.06     LV LATERAL E/E' RATIO 7.50     LV SEPTAL E/E' RATIO 10.00     TDI LATERAL 0.08     TDI 0.07     E/E' ratio 8.57     Right Atrial Pressure (from IVC) 3.0 mmHg    TV rest pulmonary artery pressure 23.04 mmHg    Echo EF Estimated 35 %   CK-MB    Collection Time: 04/11/18  5:48 PM   Result Value Ref Range    CPK 87 20 - 200 U/L    CPK MB 2.9 0.1 - 6.5 ng/mL    MB% 3.3 0.0 - 5.0 %   CBC auto differential    Collection Time: 04/12/18  4:44 AM   Result Value Ref Range    WBC 4.50 3.90 - 12.70 K/uL    RBC 3.74 (L) 4.60 - 6.20 M/uL    Hemoglobin 12.1 (L) 14.0 - 18.0 g/dL    Hematocrit 35.9 (L) 40.0 - 54.0 %    MCV 96 82 - 98 fL    MCH 32.3 (H) 27.0 - 31.0 pg    MCHC 33.7 32.0 - 36.0 g/dL    RDW 14.5 11.5 - 14.5 %    Platelets 142 (L) 150 - 350 K/uL    MPV 8.8 (L) 9.2 - 12.9 fL    Gran # (ANC) 3.2 1.8 - 7.7 K/uL    Lymph # 0.5 (L) 1.0 - 4.8 K/uL    Mono # 0.4 0.3 - 1.0 K/uL    Eos # 0.3 0.0 - 0.5 K/uL    Baso #  0.00 0.00 - 0.20 K/uL    Gran% 72.1 38.0 - 73.0 %    Lymph% 12.2 (L) 18.0 - 48.0 %    Mono% 8.8 4.0 - 15.0 %    Eosinophil% 6.6 0.0 - 8.0 %    Basophil% 0.3 0.0 - 1.9 %    Differential Method Automated    Basic metabolic panel    Collection Time: 04/12/18  4:44 AM   Result Value Ref Range    Sodium 141 136 - 145 mmol/L    Potassium 3.8 3.5 - 5.1 mmol/L    Chloride 107 95 - 110 mmol/L    CO2 24 23 - 29 mmol/L    Glucose 115 (H) 70 - 110 mg/dL    BUN, Bld 21 8 - 23 mg/dL    Creatinine 1.3 0.5 - 1.4 mg/dL    Calcium 8.8 8.7 - 10.5 mg/dL    Anion Gap 10 8 - 16 mmol/L    eGFR if African American 56 (A) >60 mL/min/1.73 m^2    eGFR if non African American 48 (A) >60 mL/min/1.73 m^2   Basic metabolic panel    Collection Time: 04/12/18  4:44 AM   Result Value Ref Range    Sodium 141 136 - 145 mmol/L    Potassium 3.8 3.5 - 5.1 mmol/L    Chloride 107 95 - 110 mmol/L    CO2 24 23 - 29 mmol/L    Glucose 115 (H) 70 - 110 mg/dL    BUN, Bld 21 8 - 23 mg/dL    Creatinine 1.3 0.5 - 1.4 mg/dL    Calcium 8.8 8.7 - 10.5 mg/dL    Anion Gap 10 8 - 16 mmol/L    eGFR if African American 56 (A) >60 mL/min/1.73 m^2    eGFR if non African American 48 (A) >60 mL/min/1.73 m^2   CBC auto differential    Collection Time: 04/12/18  4:44 AM   Result Value Ref Range    WBC 4.50 3.90 - 12.70 K/uL    RBC 3.74 (L) 4.60 - 6.20 M/uL    Hemoglobin 12.1 (L) 14.0 - 18.0 g/dL    Hematocrit 35.9 (L) 40.0 - 54.0 %    MCV 96 82 - 98 fL    MCH 32.3 (H) 27.0 - 31.0 pg    MCHC 33.7 32.0 - 36.0 g/dL    RDW 14.5 11.5 - 14.5 %    Platelets 142 (L) 150 - 350 K/uL    MPV 8.8 (L) 9.2 - 12.9 fL    Gran # (ANC) 3.2 1.8 - 7.7 K/uL    Lymph # 0.5 (L) 1.0 - 4.8 K/uL    Mono # 0.4 0.3 - 1.0 K/uL    Eos # 0.3 0.0 - 0.5 K/uL    Baso # 0.00 0.00 - 0.20 K/uL    Gran% 72.1 38.0 - 73.0 %    Lymph% 12.2 (L) 18.0 - 48.0 %    Mono% 8.8 4.0 - 15.0 %    Eosinophil% 6.6 0.0 - 8.0 %    Basophil% 0.3 0.0 - 1.9 %    Differential Method Automated    Magnesium    Collection Time: 04/12/18   4:44 AM   Result Value Ref Range    Magnesium 1.9 1.6 - 2.6 mg/dL       Diagnostic Results:  Labs: Reviewed    ASSESSMENT/PLAN:     Depressed LVEF; ACEI; beta blockers; add aldactone?

## 2018-04-13 DIAGNOSIS — I50.20 SYSTOLIC CONGESTIVE HEART FAILURE, UNSPECIFIED CONGESTIVE HEART FAILURE CHRONICITY: Primary | ICD-10-CM

## 2018-04-13 NOTE — PROGRESS NOTES
KATIE sent patient information to [facility] for authorization and acceptance to home health through [facility] care system.KATIE bailey    12:45pm Per Becca with [facility] home health agency.KATIE Bailey

## 2018-04-13 NOTE — PLAN OF CARE
04/13/18 0820   Final Note   Assessment Type Final Discharge Note   Discharge Disposition Home-Health

## 2018-04-23 ENCOUNTER — TELEPHONE (OUTPATIENT)
Dept: SURGERY | Facility: CLINIC | Age: 83
End: 2018-04-23

## 2018-04-23 NOTE — TELEPHONE ENCOUNTER
----- Message from Mala Gilbert sent at 4/23/2018 12:32 PM CDT -----  Contact: Angelito Bradley (Son)  Type: Needs Medical Advice    Who Called:  Angelito Bradley (Son)  Symptoms (please be specific):  Since surgery, the patient back has started swelling   How long has patient had these symptoms:  Couple days  Pharmacy name and phone #:  NA  Best Call Back Number:   Additional Information: Calling to speak with the Nurse to find out if the patient needs to come in and be seen.

## 2018-04-25 ENCOUNTER — OFFICE VISIT (OUTPATIENT)
Dept: SURGERY | Facility: CLINIC | Age: 83
End: 2018-04-25
Payer: MEDICARE

## 2018-04-25 VITALS — WEIGHT: 208.31 LBS | BODY MASS INDEX: 29.89 KG/M2 | TEMPERATURE: 98 F

## 2018-04-25 DIAGNOSIS — Z98.890 POST-OPERATIVE STATE: Primary | ICD-10-CM

## 2018-04-25 PROCEDURE — 99024 POSTOP FOLLOW-UP VISIT: CPT | Mod: S$GLB,,, | Performed by: SURGERY

## 2018-04-25 PROCEDURE — 99999 PR PBB SHADOW E&M-EST. PATIENT-LVL II: CPT | Mod: PBBFAC,,, | Performed by: SURGERY

## 2018-04-25 RX ORDER — DOXYCYCLINE 100 MG/1
100 CAPSULE ORAL
COMMUNITY
Start: 2018-04-17 | End: 2018-04-27

## 2018-04-25 RX ORDER — LISINOPRIL 2.5 MG/1
2.5 TABLET ORAL
COMMUNITY
Start: 2018-04-12 | End: 2019-04-12

## 2018-04-25 RX ORDER — ASPIRIN 81 MG/1
81 TABLET ORAL
COMMUNITY
Start: 2018-04-13 | End: 2019-04-13

## 2018-04-25 RX ORDER — CARVEDILOL 3.12 MG/1
3.12 TABLET ORAL
COMMUNITY
Start: 2018-04-12

## 2018-04-25 RX ORDER — AMOXICILLIN 500 MG
CAPSULE ORAL
COMMUNITY

## 2018-04-25 NOTE — PROGRESS NOTES
POST-OP NOTE    PROCEDURE: Excision of back mass.    COMPLAINTS: Swelling at the site.    EXAM: Incision well approximated. No drainage. No infection.  Findings consistent with a seroma.      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  No evidence of atypia or malignancy.  Correlate clinically.    PLAN: FU next week  Seroma aspirated.  180 cc

## 2022-06-21 NOTE — OP NOTE
DATE OF PROCEDURE:  03/22/2018.    PROCEDURE:  Excision of large right lower subcutaneous back mass.    PREOPERATIVE DIAGNOSIS:    POSTOPERATIVE DIAGNOSIS:    SURGEON:  Roberto Matthews Jr., M.D.    ASSISTANT:  Jennifer Hills.    PROCEDURE IN DETAIL:  After appropriate consent was obtained, consent forms   signed and questions answered, the patient was taken to the Operating Room and   placed on the operating room table where general anesthesia was induced via   laryngeal mask.  He was placed in the left lateral decubitus position exposing   the right back mass.  The area was prepped and draped.  A vertical incision was   made overlying this palpable mass.  Initially, this appeared to be a lipoma.  I   dissected around the mass.  The mass extended down to the fascia of the back   muscles and appeared to be almost adherent to bone.  I dissected this free of   surrounding tissues with as much of a margin as possible and removed the mass   from its attachment to the back.  Part of the fascia was removed with the mass.    Clips were used to raquel the site of the dense adherence.  Then, 0.25% Marcaine   was injected.  Adequate hemostasis was achieved.  A 3/16 ORQUIDEA drain was brought   out through a lateral stab incision and secured to the skin with 2-0 nylon   suture and then the dermis and deeper subcutaneous tissues were reapproximated   with a running 2-0 Vicryl suture and the skin was closed with 4-0 Monocryl   subcuticular stitch.  Steri-Strips and dressings were applied.  The patient was   awakened, extubated and transferred to the Recovery Room in stable condition.    He tolerated the procedure without complication.  Blood loss was approximately   50 mL.  Counts were correct at the end of the procedure.        /seb [id number] blank(s)        RTL/HN  dd: 03/22/2018 11:11:17 (CDT)  td: 03/22/2018 11:48:11 (CDT)  Doc ID   #[id number]  Job ID #[id number]    CC:       
ambulatory
